# Patient Record
Sex: FEMALE | Race: WHITE | NOT HISPANIC OR LATINO | Employment: OTHER | URBAN - METROPOLITAN AREA
[De-identification: names, ages, dates, MRNs, and addresses within clinical notes are randomized per-mention and may not be internally consistent; named-entity substitution may affect disease eponyms.]

---

## 2017-04-12 LAB — HCV AB SER-ACNC: NORMAL

## 2018-06-13 RX ORDER — ENALAPRIL MALEATE 5 MG/1
7.5 TABLET ORAL 2 TIMES DAILY
COMMUNITY
Start: 2018-02-19 | End: 2018-06-18 | Stop reason: SDUPTHER

## 2018-06-13 RX ORDER — SACCHAROMYCES BOULARDII 250 MG
250 CAPSULE ORAL 2 TIMES DAILY
COMMUNITY
End: 2021-04-12 | Stop reason: SDUPTHER

## 2018-06-13 RX ORDER — B-COMPLEX WITH VITAMIN C
1 TABLET ORAL 2 TIMES DAILY
COMMUNITY

## 2018-06-13 RX ORDER — ESTRADIOL 10 UG/1
10 INSERT VAGINAL 2 TIMES WEEKLY
COMMUNITY
Start: 2018-02-05

## 2018-06-13 RX ORDER — CLOTRIMAZOLE AND BETAMETHASONE DIPROPIONATE 10; .64 MG/G; MG/G
CREAM TOPICAL
COMMUNITY
Start: 2017-10-30

## 2018-06-13 RX ORDER — FELODIPINE 5 MG/1
5 TABLET, EXTENDED RELEASE ORAL
COMMUNITY
Start: 2017-10-19 | End: 2018-10-15 | Stop reason: SDUPTHER

## 2018-06-13 RX ORDER — GARLIC 400 MG
1 TABLET ORAL DAILY
COMMUNITY

## 2018-06-13 RX ORDER — ASCORBIC ACID 500 MG
500 TABLET ORAL 2 TIMES DAILY
COMMUNITY
End: 2020-04-13

## 2018-06-13 RX ORDER — LEVOCETIRIZINE DIHYDROCHLORIDE 5 MG/1
5 TABLET, FILM COATED ORAL EVERY EVENING
COMMUNITY
Start: 2016-08-03 | End: 2020-10-12

## 2018-06-13 RX ORDER — PANTOPRAZOLE SODIUM 20 MG/1
20 TABLET, DELAYED RELEASE ORAL DAILY
COMMUNITY
Start: 2016-10-01 | End: 2019-10-14

## 2018-06-13 RX ORDER — DIAPER,BRIEF,ADULT, DISPOSABLE
1 EACH MISCELLANEOUS 3 TIMES DAILY
COMMUNITY
End: 2021-10-18

## 2018-06-18 ENCOUNTER — OFFICE VISIT (OUTPATIENT)
Dept: FAMILY MEDICINE CLINIC | Facility: CLINIC | Age: 73
End: 2018-06-18
Payer: COMMERCIAL

## 2018-06-18 VITALS
WEIGHT: 107 LBS | SYSTOLIC BLOOD PRESSURE: 184 MMHG | DIASTOLIC BLOOD PRESSURE: 84 MMHG | HEART RATE: 74 BPM | BODY MASS INDEX: 20.2 KG/M2 | HEIGHT: 61 IN | OXYGEN SATURATION: 97 %

## 2018-06-18 DIAGNOSIS — F41.9 ANXIETY: ICD-10-CM

## 2018-06-18 DIAGNOSIS — I10 ESSENTIAL HYPERTENSION: Primary | ICD-10-CM

## 2018-06-18 DIAGNOSIS — N81.11 MIDLINE CYSTOCELE: ICD-10-CM

## 2018-06-18 DIAGNOSIS — E78.00 HYPERCHOLESTEROLEMIA: ICD-10-CM

## 2018-06-18 PROCEDURE — 99203 OFFICE O/P NEW LOW 30 MIN: CPT | Performed by: INTERNAL MEDICINE

## 2018-06-18 RX ORDER — BUSPIRONE HYDROCHLORIDE 5 MG/1
5 TABLET ORAL 2 TIMES DAILY
Qty: 60 TABLET | Refills: 3 | Status: SHIPPED | OUTPATIENT
Start: 2018-06-18 | End: 2019-06-11

## 2018-06-18 RX ORDER — ASCORBIC ACID 125 MG
125 TABLET,CHEWABLE ORAL EVERY EVENING
COMMUNITY

## 2018-06-18 RX ORDER — GUAIFENESIN 600 MG
1200 TABLET, EXTENDED RELEASE 12 HR ORAL DAILY
COMMUNITY
End: 2020-04-13

## 2018-06-18 RX ORDER — ENALAPRIL MALEATE 5 MG/1
7.5 TABLET ORAL 2 TIMES DAILY
Qty: 270 TABLET | Refills: 3 | Status: SHIPPED | OUTPATIENT
Start: 2018-06-18 | End: 2019-04-25

## 2018-06-18 NOTE — ASSESSMENT & PLAN NOTE
She has always had office hypertension but only recently has had more labored bili D at home  After long discussion, we decided to go ahead and  Increase her enalapril to 7 5 mg twice a day  She will continue home blood pressure monitoring and send in a [employer] message in a few weeks with her readings

## 2018-06-18 NOTE — ASSESSMENT & PLAN NOTE
She has been intolerant of multiple statins  Continue current supplements  Check fasting lipid panel before next visit

## 2018-06-18 NOTE — PATIENT INSTRUCTIONS
Blood pressure is not well controlled  We will increase enalapril to 7 5 mg twice a day  Please continue to monitor home blood pressures and send readings in about 4 weeks through [facility]  Continue follow-up with Dr Maria D Benson for the bladder prolapse issue  Start Buspar 5 mg twice a day for anxiety

## 2018-06-18 NOTE — ASSESSMENT & PLAN NOTE
Recommended she follow up with the urogynecologist and perhaps get a 2nd opinion if needed regarding the type of mesh to be used for the repair

## 2018-06-18 NOTE — PROGRESS NOTES
Assessment/Plan:    No problem-specific Assessment & Plan notes found for this encounter  Chief complaint: Blood pressure is not controlled  HPI:    Dylan العلي is a 68 y o  female   Who presents for new patient visit  Her home blood pressures have been labile  She has many readings that are in the 031/73 systolic  She has not had any chest pain, shortness of breath, or palpitations  However, when she is anxious, she gets readings in the 160s  Her current concern is related to her need for a sling procedure to repair her cystocele  She had taken Librium in the past for anxiety but does not have a prescription now  Past Medical History:   Diagnosis Date    Anxiety     Cystocele, midline     GERD (gastroesophageal reflux disease)     Hypercholesterolemia     Hyperplastic colonic polyp 09/30/2005    Hypertension     essential     Infected cat bite of hand Approximately 2008     she was admitted to [facility] for intravenous therapy after a cat bite became infected  No sequelae      Menopause     Osteoporosis     Palpitations     Proximal leg weakness 04/24/2017    Vitamin D deficiency         Past Surgical History:   Procedure Laterality Date    CHOLECYSTECTOMY      COMBINED HYSTEROSCOPY DIAGNOSTIC / D&C  1966, 1990    HYSTERECTOMY  1991    LITHOTRIPSY  1990    SKIN CANCER EXCISION      TEAR DUCT SURGERY  1996    TONSILLECTOMY  1950s    TOTAL ABDOMINAL HYSTERECTOMY W/ BILATERAL SALPINGOOPHORECTOMY  1991    TUBAL LIGATION      1997        Family History   Problem Relation Age of Onset    Heart disease Mother     Hypertension Mother     Stroke Mother     Cancer Mother     Breast cancer Father     Coronary artery disease Father     Hypertension Father     Stroke Maternal Grandmother     Heart disease Paternal Grandfather         Social History     Social History    Marital status: /Civil Union     Spouse name: N/A    Number of children: N/A    Years of education: N/A     Occupational History    Not on file       Social History Main Topics    Smoking status: Former Smoker     Packs/day: 2 50     Years: 3 00    Smokeless tobacco: Never Used    Alcohol use No    Drug use: No    Sexual activity: Not on file     Other Topics Concern    Not on file     Social History Narrative    No narrative on file      Current Outpatient Prescriptions   Medication Sig Dispense Refill    ACIDOPHILUS LACTOBACILLUS PO Take 1 tablet by mouth daily        ascorbic acid (VITAMIN C) 500 mg tablet Take 500 mg by mouth 2 (two) times a day        B Complex Vitamins (VITAMIN B COMPLEX) TABS Take 1 tablet by mouth 2 (two) times a day        Coenzyme Q10 (CO Q 10) 100 MG CAPS Take 1 tablet by mouth 3 (three) times a day        enalapril (VASOTEC) 5 mg tablet  take one tablet by mouth twice a day      estradiol (VAGIFEM, YUVAFEM) 10 MCG TABS vaginal tablet Insert 10 mcg into the vagina every other day        felodipine (PLENDIL) 5 mg 24 hr tablet Take 5 mg by mouth daily at bedtime        Garlic 396 MG TBEC Take 1 tablet by mouth daily        guaiFENesin (MUCINEX) 600 mg 12 hr tablet Take 1,200 mg by mouth daily      Lecithin 1200 MG CAPS Take 1 capsule by mouth 3 (three) times a day        levocetirizine (XYZAL) 5 MG tablet Take 5 mg by mouth every evening        loteprednol (ALREX) 0 2 % SUSP Apply 1 drop to eye daily        Magnesium Citrate 125 MG CAPS Take 125 mg by mouth every evening      Multiple Vitamins-Minerals (MULTIVITAMIN ADULT PO) Take 1 tablet by mouth daily        OMEGA-3 FATTY ACIDS PO Take 1 capsule by mouth 2 (two) times a day        pantoprazole (PROTONIX) 20 mg tablet Take 20 mg by mouth daily        saccharomyces boulardii (FLORASTOR) 250 mg capsule Take 250 mg by mouth 2 (two) times a day        sodium chloride (SHAKA 128) 2 % hypertonic ophthalmic solution Administer 1 drop to both eyes 2 (two) times a day        busPIRone (BUSPAR) 5 mg tablet Take 1 tablet (5 mg total) by mouth 2 (two) times a day 60 tablet 3    clotrimazole-betamethasone (LOTRISONE) 1-0 05 % cream Apply to affected area 2 times daily as needed            Allergies   Allergen Reactions    Clindamycin      Clindamycin vaginal cream    Alendronate Other (See Comments)     Reaction: Lump in throat     Gabapentin     Nitrofurantoin Other (See Comments)     Reaction: Fever, achy  Did okay on 3 day course of medication 2016    Other Other (See Comments)     *Illisone; *Noxema    Penicillins Other (See Comments)     Reaction: [GI]  Has taken ampicillin and did fine recently   2016    Prochlorperazine Other (See Comments)     Reaction: Muscle spasms    Pseudoephedrine Other (See Comments)     Reaction: heart racing    Sertraline Other (See Comments)     Reaction: Nose bleeds, Head tremors    Simvastatin Other (See Comments)     Reaction:[Derm]    Tetracyclines & Related Other (See Comments)     [GI]    Thiazide-Type Diuretics     Vancomycin     Amoxicillin-Pot Clavulanate Rash    Olopatadine Palpitations       Review of Systems   Constitutional: Negative for chills, diaphoresis and unexpected weight change  Respiratory: Negative for chest tightness and shortness of breath  Cardiovascular: Negative for chest pain  She walks a mi or more every day without any exertional chest pain or shortness of breath  Gastrointestinal: Positive for constipation  She uses magnesium citrate capsules to regulate constipation  She has had colonoscopy in the past    Genitourinary:        No dysuria  She does have discomfort related to bladder prolapse  She is able to reposition her bladder manually but this has to be done several times a day         BP (!) 184/84 (BP Location: Right arm, Patient Position: Sitting, Cuff Size: Adult)   Pulse 74   Ht 5' 1" (1 549 m)   Wt 48 5 kg (107 lb)   SpO2 97%   BMI 20 22 kg/m²     General:  Well-developed, well-nourished, in no acute distress  Skin:  Warm, moist, no significant lesions  HENT:  Normocephalic, atraumatic, tympanic membranes clear bilaterally, ear canals unremarkable bilaterally, nasal mucosa without lesions, oropharynx was clear without exudate  Eyes: PERRL, EOMI, conjunctivae normal, fundi revealed sharp discs bilaterally without hemorrhages or exudates  Neck:  No thyromegaly, thyroid nodules or cervical lymphadenopathy  Cardiac:  Regular rate and rhythm, no murmur, gallop, or rub  There is no JVD or HJR  Lungs:  Clear to auscultation and percussion  Breasts:   Deferred to gyn  Abdomen:  Soft, nontender, normoactive bowel sounds, no palpable masses, no hepatosplenomegaly  :  Deferred to gyn  Musculoskeletal:  No clubbing, cyanosis, or edema  Neurologic:  Cranial nerves 2-12 intact, motor was 5/5 in all major groups, DTRs 2+ throughout  Psychiatric:  Mood bright, appropriate affect and insight

## 2018-06-26 NOTE — TELEPHONE ENCOUNTER
Reviewed information about buspirone  It is not metabolized by the enzyme that she has the problem with  This medication should be OK for her to take

## 2018-08-06 ENCOUNTER — TELEPHONE (OUTPATIENT)
Dept: FAMILY MEDICINE CLINIC | Facility: CLINIC | Age: 73
End: 2018-08-06

## 2018-08-06 NOTE — TELEPHONE ENCOUNTER
Please contact Aren Chapman to let her know that I did receive her blood pressures in the mail  The majority of her blood pressures are actually pretty good with the exception of 3 readings:  July 1st 171/85, July 3rd 150/79, and July 24th 174/100 (this last when she was at Dr Shaunna Smith office)  I would continue on her current dose of Vasotec  Please ask her to sign up for [address] so we can communicate via e-mail

## 2018-10-15 ENCOUNTER — TELEPHONE (OUTPATIENT)
Dept: FAMILY MEDICINE CLINIC | Facility: CLINIC | Age: 73
End: 2018-10-15

## 2018-10-15 DIAGNOSIS — I10 ESSENTIAL HYPERTENSION: Primary | ICD-10-CM

## 2018-10-15 RX ORDER — FELODIPINE 5 MG/1
5 TABLET, EXTENDED RELEASE ORAL
Qty: 90 TABLET | Refills: 3 | Status: SHIPPED | OUTPATIENT
Start: 2018-10-15 | End: 2019-04-25

## 2018-10-15 NOTE — TELEPHONE ENCOUNTER
Patient requesting refill of plendil be sent to [facility]   Thanks

## 2018-12-03 ENCOUNTER — TELEPHONE (OUTPATIENT)
Dept: FAMILY MEDICINE CLINIC | Facility: CLINIC | Age: 73
End: 2018-12-03

## 2018-12-03 NOTE — LETTER
December 3, 2018     Antony Humphrey MD  [facility] [address] [facility]    Patient: Marina Serrano   YOB: 1945   Date of Visit: 12/3/2018       Dear Kp Later:    I have been managing Guera's blood pressure for about 20 years  Her office blood pressures are always higher than her home blood pressures which she checks frequently  Please let me know if you have any other concerns  I hope you and family are doing well         Sincerely,                           Chapin Osborn MD

## 2018-12-03 NOTE — TELEPHONE ENCOUNTER
Patient needs letter faxed to Dr Rhonda Sánchez  OB/GYN  That you are taking care of her blood pressure        Fax number is [phone]    Thank you

## 2018-12-05 ENCOUNTER — TELEPHONE (OUTPATIENT)
Dept: FAMILY MEDICINE CLINIC | Facility: CLINIC | Age: 73
End: 2018-12-05

## 2018-12-05 DIAGNOSIS — M25.551 RIGHT HIP PAIN: Primary | ICD-10-CM

## 2018-12-05 NOTE — TELEPHONE ENCOUNTER
Velma called, she's been having right hip discomfort and would like an xray to see if anything shows up as to the cause  If this is okay without her being seen please put in the order for the right xray   Thank you

## 2018-12-12 RX ORDER — ESTRADIOL 0.1 MG/G
2 CREAM VAGINAL EVERY OTHER DAY
COMMUNITY

## 2019-04-25 ENCOUNTER — OFFICE VISIT (OUTPATIENT)
Dept: FAMILY MEDICINE CLINIC | Facility: CLINIC | Age: 74
End: 2019-04-25
Payer: COMMERCIAL

## 2019-04-25 VITALS
WEIGHT: 108.2 LBS | BODY MASS INDEX: 20.43 KG/M2 | OXYGEN SATURATION: 99 % | HEIGHT: 61 IN | HEART RATE: 80 BPM | DIASTOLIC BLOOD PRESSURE: 82 MMHG | SYSTOLIC BLOOD PRESSURE: 160 MMHG

## 2019-04-25 DIAGNOSIS — G89.29 CHRONIC PAIN OF BOTH KNEES: ICD-10-CM

## 2019-04-25 DIAGNOSIS — M25.561 CHRONIC PAIN OF BOTH KNEES: ICD-10-CM

## 2019-04-25 DIAGNOSIS — I10 ESSENTIAL HYPERTENSION: ICD-10-CM

## 2019-04-25 DIAGNOSIS — M25.562 CHRONIC PAIN OF BOTH KNEES: ICD-10-CM

## 2019-04-25 DIAGNOSIS — F41.0 PANIC DISORDER: Primary | ICD-10-CM

## 2019-04-25 PROCEDURE — 99214 OFFICE O/P EST MOD 30 MIN: CPT | Performed by: INTERNAL MEDICINE

## 2019-04-25 PROCEDURE — 3725F SCREEN DEPRESSION PERFORMED: CPT | Performed by: INTERNAL MEDICINE

## 2019-04-25 RX ORDER — FELODIPINE 10 MG/1
10 TABLET, EXTENDED RELEASE ORAL
COMMUNITY
Start: 2019-04-25 | End: 2021-02-08 | Stop reason: SDUPTHER

## 2019-04-25 RX ORDER — ENALAPRIL MALEATE 10 MG/1
10 TABLET ORAL 2 TIMES DAILY
COMMUNITY
Start: 2019-04-25 | End: 2020-02-24 | Stop reason: SDUPTHER

## 2019-04-25 RX ORDER — CHLORDIAZEPOXIDE HYDROCHLORIDE 5 MG/1
5 CAPSULE, GELATIN COATED ORAL DAILY PRN
Qty: 30 CAPSULE | Refills: 0 | Status: SHIPPED | OUTPATIENT
Start: 2019-04-25 | End: 2019-10-14

## 2019-04-29 ENCOUNTER — TELEPHONE (OUTPATIENT)
Dept: FAMILY MEDICINE CLINIC | Facility: CLINIC | Age: 74
End: 2019-04-29

## 2019-05-06 ENCOUNTER — TELEPHONE (OUTPATIENT)
Dept: FAMILY MEDICINE CLINIC | Facility: CLINIC | Age: 74
End: 2019-05-06

## 2019-05-06 DIAGNOSIS — Z12.31 ENCOUNTER FOR SCREENING MAMMOGRAM FOR BREAST CANCER: Primary | ICD-10-CM

## 2019-05-28 ENCOUNTER — TELEPHONE (OUTPATIENT)
Dept: FAMILY MEDICINE CLINIC | Facility: CLINIC | Age: 74
End: 2019-05-28

## 2019-05-28 DIAGNOSIS — B37.3 VAGINAL CANDIDIASIS: Primary | ICD-10-CM

## 2019-05-28 RX ORDER — FLUCONAZOLE 150 MG/1
150 TABLET ORAL ONCE
Qty: 1 TABLET | Refills: 0 | Status: SHIPPED | OUTPATIENT
Start: 2019-05-28 | End: 2019-05-28

## 2019-06-01 DIAGNOSIS — Z12.31 ENCOUNTER FOR SCREENING MAMMOGRAM FOR BREAST CANCER: ICD-10-CM

## 2019-06-11 ENCOUNTER — TRANSCRIBE ORDERS (OUTPATIENT)
Dept: ADMINISTRATIVE | Facility: HOSPITAL | Age: 74
End: 2019-06-11

## 2019-06-11 ENCOUNTER — OFFICE VISIT (OUTPATIENT)
Dept: FAMILY MEDICINE CLINIC | Facility: CLINIC | Age: 74
End: 2019-06-11
Payer: COMMERCIAL

## 2019-06-11 ENCOUNTER — APPOINTMENT (OUTPATIENT)
Dept: LAB | Facility: CLINIC | Age: 74
End: 2019-06-11
Payer: COMMERCIAL

## 2019-06-11 VITALS
HEART RATE: 81 BPM | DIASTOLIC BLOOD PRESSURE: 82 MMHG | OXYGEN SATURATION: 98 % | WEIGHT: 103.8 LBS | SYSTOLIC BLOOD PRESSURE: 150 MMHG | HEIGHT: 61 IN | BODY MASS INDEX: 19.6 KG/M2

## 2019-06-11 DIAGNOSIS — M62.81 MUSCLE WEAKNESS OF LOWER EXTREMITY: ICD-10-CM

## 2019-06-11 DIAGNOSIS — M62.81 MUSCLE WEAKNESS OF LOWER EXTREMITY: Primary | ICD-10-CM

## 2019-06-11 LAB
ALT SERPL W P-5'-P-CCNC: 28 U/L (ref 12–78)
AST SERPL W P-5'-P-CCNC: 20 U/L (ref 5–45)
CK SERPL-CCNC: 38 U/L (ref 26–192)
LDH SERPL-CCNC: 229 U/L (ref 81–234)

## 2019-06-11 PROCEDURE — 99214 OFFICE O/P EST MOD 30 MIN: CPT | Performed by: INTERNAL MEDICINE

## 2019-06-11 PROCEDURE — 86038 ANTINUCLEAR ANTIBODIES: CPT

## 2019-06-11 PROCEDURE — 83519 RIA NONANTIBODY: CPT

## 2019-06-11 PROCEDURE — 36415 COLL VENOUS BLD VENIPUNCTURE: CPT

## 2019-06-11 PROCEDURE — 84238 ASSAY NONENDOCRINE RECEPTOR: CPT

## 2019-06-11 PROCEDURE — 84460 ALANINE AMINO (ALT) (SGPT): CPT

## 2019-06-11 PROCEDURE — 82085 ASSAY OF ALDOLASE: CPT

## 2019-06-11 PROCEDURE — 84450 TRANSFERASE (AST) (SGOT): CPT

## 2019-06-11 PROCEDURE — 82550 ASSAY OF CK (CPK): CPT

## 2019-06-11 PROCEDURE — 83615 LACTATE (LD) (LDH) ENZYME: CPT

## 2019-06-12 LAB
ALDOLASE SERPL-CCNC: 5.6 U/L (ref 3.3–10.3)
RYE IGE QN: NEGATIVE

## 2019-06-13 LAB — ACHR BIND AB SER-SCNC: 0.07 NMOL/L (ref 0–0.24)

## 2019-06-14 LAB — ACHR BLOCK AB/ACHR TOTAL SFR SER: 18 % (ref 0–25)

## 2019-06-17 ENCOUNTER — TELEPHONE (OUTPATIENT)
Dept: FAMILY MEDICINE CLINIC | Facility: CLINIC | Age: 74
End: 2019-06-17

## 2019-06-17 LAB — ACHR MOD AB/ACHR TOTAL SFR SER: <12 % (ref 0–20)

## 2019-07-01 ENCOUNTER — OFFICE VISIT (OUTPATIENT)
Dept: FAMILY MEDICINE CLINIC | Facility: CLINIC | Age: 74
End: 2019-07-01
Payer: COMMERCIAL

## 2019-07-01 VITALS
OXYGEN SATURATION: 98 % | DIASTOLIC BLOOD PRESSURE: 84 MMHG | HEIGHT: 61 IN | WEIGHT: 102.8 LBS | SYSTOLIC BLOOD PRESSURE: 150 MMHG | BODY MASS INDEX: 19.41 KG/M2 | HEART RATE: 79 BPM

## 2019-07-01 DIAGNOSIS — M81.8 OTHER OSTEOPOROSIS WITHOUT CURRENT PATHOLOGICAL FRACTURE: ICD-10-CM

## 2019-07-01 DIAGNOSIS — I10 ESSENTIAL HYPERTENSION: ICD-10-CM

## 2019-07-01 DIAGNOSIS — Z79.899 ENCOUNTER FOR MONITORING DIURETIC THERAPY: Primary | ICD-10-CM

## 2019-07-01 DIAGNOSIS — Z51.81 ENCOUNTER FOR MONITORING DIURETIC THERAPY: Primary | ICD-10-CM

## 2019-07-01 PROCEDURE — 99213 OFFICE O/P EST LOW 20 MIN: CPT | Performed by: INTERNAL MEDICINE

## 2019-07-01 PROCEDURE — 1036F TOBACCO NON-USER: CPT | Performed by: INTERNAL MEDICINE

## 2019-07-01 PROCEDURE — 3008F BODY MASS INDEX DOCD: CPT | Performed by: INTERNAL MEDICINE

## 2019-07-01 PROCEDURE — 1125F AMNT PAIN NOTED PAIN PRSNT: CPT | Performed by: INTERNAL MEDICINE

## 2019-07-01 PROCEDURE — 1101F PT FALLS ASSESS-DOCD LE1/YR: CPT | Performed by: INTERNAL MEDICINE

## 2019-07-01 PROCEDURE — 1160F RVW MEDS BY RX/DR IN RCRD: CPT | Performed by: INTERNAL MEDICINE

## 2019-07-01 PROCEDURE — 1170F FXNL STATUS ASSESSED: CPT | Performed by: INTERNAL MEDICINE

## 2019-07-01 PROCEDURE — G0438 PPPS, INITIAL VISIT: HCPCS | Performed by: INTERNAL MEDICINE

## 2019-07-01 RX ORDER — SPIRONOLACTONE 25 MG/1
12.5 TABLET ORAL DAILY
Qty: 15 TABLET | Refills: 5 | Status: SHIPPED | OUTPATIENT
Start: 2019-07-01 | End: 2019-10-14

## 2019-07-01 NOTE — ASSESSMENT & PLAN NOTE
Both office and home blood pressures are elevated  We are going to add spironolactone 25 mg 1/2 tablet daily as tolerated  We will check a basic metabolic panel in 2 weeks  Call in blood pressures in 2 weeks

## 2019-07-01 NOTE — ASSESSMENT & PLAN NOTE
We discussed several options for treatment but she is particularly concerned about taking the drugs that are only given twice a year because once that drugs in her system, she will be stuck with the side effects that it might cause  I suggest that she actually go to see Dr Anila Mercado to get his expert opinion about what the best options would be for her

## 2019-07-01 NOTE — PATIENT INSTRUCTIONS
Essential hypertension  Both office sent home blood pressures are elevated  We are going to add spironolactone 25 mg 1/2 tablet daily as tolerated  We will check a basic metabolic panel in 2 weeks  Call in blood pressures in 2 weeks

## 2019-07-01 NOTE — PROGRESS NOTES
Han Flores is here for her Subsequent Wellness visit  Last Medicare Wellness visit information reviewed, patient interviewed and updates made to the record today  Health Risk Assessment:  Patient rates overall health as good  Patient feels that their physical health rating is Same  Eyesight was rated as Same  Hearing was rated as Same  Patient feels that their emotional and mental health rating is Same  Pain experienced by patient in the last 7 days has been None  Patient states that she has experienced no weight loss or gain in last 6 months  Emotional/Mental Health:  Patient has not been feeling nervous/anxious  PHQ-9 Depression Screening:    Frequency of the following problems over the past two weeks:      1  Little interest or pleasure in doing things: 0 - not at all      2  Feeling down, depressed, or hopeless: 0 - not at all  PHQ-2 Score: 0          Broken Bones/Falls: Fall Risk Assessment:    In the past year, patient has experienced: No history of falling in past year          Bladder/Bowel:  Patient has not leaked urine accidently in the last six months  Patient reports no loss of bowel control  Immunizations:  Patient has had a flu vaccination within the last year  Patient has received a pneumonia shot  Patient has not received a shingles shot  Patient has not received tetanus/diphtheria shot  Home Safety:  Patient does not have trouble with stairs inside or outside of their home  Patient currently reports that there are no safety hazards present in home, working smoke alarms, working carbon monoxide detectors  Preventative Screenings:   Breast cancer screening performed, colon cancer screen completed, cholesterol screen completed, glaucoma eye exam completed,     Nutrition:  Current diet: Low Saturated Fat, Limited junk food and No Added Salt with servings of the following:    Medications:  Patient is currently taking over-the-counter supplements       List of OTC medications includes: See med list  Patient is able to manage medications  Lifestyle Choices:  Patient reports no tobacco use  Patient has smoked or used tobacco in the past   Patient has stopped her tobacco use  Tobacco use quit date: 1960s  Patient reports no alcohol use  Patient drives a vehicle  Patient wears seat belt  Current level of exercise of physical activity described by patient as: Walks 30 minutes most day           Activities of Daily Living:  Can get out of bed by his or her self, able to dress self, able to make own meals, able to do own shopping, able to bathe self, can do own laundry/housekeeping, can manage own money, pay bills and track expenses    Previous Hospitalizations:  Hospitalization or ED visit in past 12 months  Number of hospitalizations within the last year: 1-2  Additional Comments: Had an ED visit for urinary retention after bladder surgery  Advanced Directives:  Patient has decided on a power of   Patient has spoken to designated power of   Patient has not completed advanced directive  Additional Comments: Discussed importance of completing living will  Social Support: Lives with spouse

## 2019-07-01 NOTE — PROGRESS NOTES
Assessment/Plan:    Problem List Items Addressed This Visit        Cardiovascular and Mediastinum    Essential hypertension     Both office and home blood pressures are elevated  We are going to add spironolactone 25 mg 1/2 tablet daily as tolerated  We will check a basic metabolic panel in 2 weeks  Call in blood pressures in 2 weeks  Relevant Medications    spironolactone (ALDACTONE) 25 mg tablet       Musculoskeletal and Integument    Osteoporosis     We discussed several options for treatment but she is particularly concerned about taking the drugs that are only given twice a year because once that drugs in her system, she will be stuck with the side effects that it might cause  I suggest that she actually go to see Dr Nelly Santiago to get his expert opinion about what the best options would be for her  Other Visit Diagnoses     Encounter for monitoring diuretic therapy    -  Primary    Relevant Orders    Basic metabolic panel          Subjective:     Patient ID: Coni Mae is a 69 y o  female was here today for annual wellness visit but brought in her home blood pressures which have been elevated  She usually has home blood pressures which are less than 143 systolic  However, her last several readings are 191-213 systolic  She is still taking enalapril 10 mg twice a day and felodipine 10 mg daily  She had taken will was probably hydrochlorothiazide in the past (given by another primary care physician) which caused her blood pressure to bottom out    In addition, the PA at the rheumatologist's office ordered a DEXA which does show that she has osteoporosis in her lumbar spine and in the femoral neck  She was advised to go on treatment for osteoporosis but she is wary of taking any medications because of her sensitivity to medications (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

## 2019-07-08 ENCOUNTER — TELEPHONE (OUTPATIENT)
Dept: FAMILY MEDICINE CLINIC | Facility: CLINIC | Age: 74
End: 2019-07-08

## 2019-07-08 NOTE — TELEPHONE ENCOUNTER
Patient was started on Spirnolactone last Thursday with Dr Oliver Arely  She is not complaining of diarrhea since Friday  She is wondering if it is from the medication and what she should do  She is having some stomach cramping also       Thank you

## 2019-07-08 NOTE — TELEPHONE ENCOUNTER
It is a common side effect of the drug and is stomach upset and abdominal cramping  She should try to continue with the medicine  Can take imodium to stop the diarrhea and Pepcid OTC to help with the stomach upset

## 2019-07-09 NOTE — TELEPHONE ENCOUNTER
Spironolactone can have side effects related to potassium  I know Dr Benito Mcgee wanted her to have a metabolic panel drawn in two weeks from starting the medication to check her potassium etc but she should have it drawn now  If she is experiencing this much discomfort from the medication, the other option is to stop it at the present time (since she just began the therapy) and he can address it on Monday when he returns

## 2019-07-15 NOTE — TELEPHONE ENCOUNTER
If she stopped the meds, there's no need to have labs done  I was doing the test to monitor potassium

## 2019-07-15 NOTE — TELEPHONE ENCOUNTER
Patient and  aware  They will continue to monitor her blood pressure reading at home   Will call with any concerns

## 2019-07-15 NOTE — TELEPHONE ENCOUNTER
Patient said she stopped meds 2 days ago  Patient is wondering when you would like her to have labs done   Patient's cell phone number is [phone]

## 2019-07-24 ENCOUNTER — TELEPHONE (OUTPATIENT)
Dept: FAMILY MEDICINE CLINIC | Facility: CLINIC | Age: 74
End: 2019-07-24

## 2019-07-24 NOTE — TELEPHONE ENCOUNTER
Patient called and stated that you have asked her to monitor her blood pressure and she stated that the Systolic has been between 542-014 and the Diastolic has been from 50-93

## 2019-09-18 ENCOUNTER — TELEPHONE (OUTPATIENT)
Dept: FAMILY MEDICINE CLINIC | Facility: CLINIC | Age: 74
End: 2019-09-18

## 2019-09-18 DIAGNOSIS — R25.2 LEG CRAMPS: Primary | ICD-10-CM

## 2019-09-18 NOTE — TELEPHONE ENCOUNTER
Patient is having leg cramps only at night  She is wondering what you would recommend she do? It has been getting worse she said

## 2019-09-18 NOTE — TELEPHONE ENCOUNTER
We should check electrolytes and magnesium level  Treatment of leg cramps is somewhat challenging as there is no one medication that has been shown to really be effective  Furthermore, she is very sensitive to many medications  I would recommend that she try doing calf stretches against a wall with her arms leaning on the wall while keeping her legs back every evening before bedtime  She should also loosen the sheets at the foot of the bed  If this approach is ineffective at decreasing the leg cramps, we can discuss other treatments

## 2019-10-10 LAB
CHOLEST SERPL-MCNC: 188 MG/DL (ref ?–200)
EXT GLUCOSE BLD: 100 (ref 70–99)
EXTERNAL ANION GAP: 7
EXTERNAL BUN: 13
EXTERNAL CALCIUM: 10.1
EXTERNAL CHLORIDE: 105 (ref ?–107)
EXTERNAL CO2: 30.4
EXTERNAL CREATININE: 0.7
EXTERNAL EGFR: 129.69
EXTERNAL POTASSIUM: 3.9 (ref ?–5.1)
EXTERNAL SODIUM: 142 (ref ?–145)
HDLC SERPL-MCNC: 70 MG/DL (ref 40–60)
LDLC SERPL CALC-MCNC: 106 MG/DL (ref 0–100)
TRIGL SERPL-MCNC: 58 MG/DL (ref ?–150)

## 2019-10-14 ENCOUNTER — OFFICE VISIT (OUTPATIENT)
Dept: FAMILY MEDICINE CLINIC | Facility: CLINIC | Age: 74
End: 2019-10-14
Payer: COMMERCIAL

## 2019-10-14 VITALS
BODY MASS INDEX: 19.85 KG/M2 | HEIGHT: 61 IN | WEIGHT: 105.16 LBS | HEART RATE: 68 BPM | SYSTOLIC BLOOD PRESSURE: 150 MMHG | DIASTOLIC BLOOD PRESSURE: 92 MMHG | OXYGEN SATURATION: 98 %

## 2019-10-14 DIAGNOSIS — E88.89 POOR DRUG METABOLIZER DUE TO CYTOCHROME P450 CYP2D6 VARIANT (HCC): ICD-10-CM

## 2019-10-14 DIAGNOSIS — K21.9 GASTROESOPHAGEAL REFLUX DISEASE, ESOPHAGITIS PRESENCE NOT SPECIFIED: ICD-10-CM

## 2019-10-14 DIAGNOSIS — G47.62 NOCTURNAL LEG CRAMPS: ICD-10-CM

## 2019-10-14 DIAGNOSIS — E78.00 HYPERCHOLESTEROLEMIA: ICD-10-CM

## 2019-10-14 DIAGNOSIS — Z23 NEEDS FLU SHOT: Primary | ICD-10-CM

## 2019-10-14 DIAGNOSIS — I10 ESSENTIAL HYPERTENSION: ICD-10-CM

## 2019-10-14 DIAGNOSIS — M81.8 OTHER OSTEOPOROSIS WITHOUT CURRENT PATHOLOGICAL FRACTURE: ICD-10-CM

## 2019-10-14 PROCEDURE — 99214 OFFICE O/P EST MOD 30 MIN: CPT | Performed by: INTERNAL MEDICINE

## 2019-10-14 PROCEDURE — 90662 IIV NO PRSV INCREASED AG IM: CPT | Performed by: INTERNAL MEDICINE

## 2019-10-14 PROCEDURE — 3008F BODY MASS INDEX DOCD: CPT | Performed by: INTERNAL MEDICINE

## 2019-10-14 PROCEDURE — G0008 ADMIN INFLUENZA VIRUS VAC: HCPCS | Performed by: INTERNAL MEDICINE

## 2019-10-14 NOTE — PATIENT INSTRUCTIONS
Continue current medications  Continue to track home blood pressure  Get blood work a few days before next visit

## 2019-10-14 NOTE — ASSESSMENT & PLAN NOTE
She has office hypertension  Her home blood pressures are actually much better controlled even though she did not tolerate taking spironolactone  Continue enalapril and felodipine

## 2019-10-14 NOTE — ASSESSMENT & PLAN NOTE
We again discussed the long-term risk of fractures without treatment for the osteoporosis  She is wary of taking any medications for osteoporosis and does not want to go back to see the rheumatologist   Expectant management at this point  Continue exercise

## 2019-10-14 NOTE — ASSESSMENT & PLAN NOTE
Leg stretches before bedtime have help prevent the cramps    Advised her to set her reminder on her iPhone so she remembers to do her leg exercises before bedtime

## 2019-10-14 NOTE — ASSESSMENT & PLAN NOTE
No reflux symptoms despite being off of Protonix  Continue Perfusia SR since it seems to have improved symptoms    We did discuss there is no good research supporting the efficacy or safety of most supplements or herbal

## 2019-10-14 NOTE — PROGRESS NOTES
Assessment/Plan:    Problem List Items Addressed This Visit        Digestive    Gastroesophageal reflux disease     No reflux symptoms despite being off of Protonix  Continue Perfusia SR since it seems to have improved symptoms  We did discuss there is no good research supporting the efficacy or safety of most supplements or herbal             Cardiovascular and Mediastinum    Essential hypertension     She has office hypertension  Her home blood pressures are actually much better controlled even though she did not tolerate taking spironolactone  Continue enalapril and felodipine  Relevant Orders    Basic metabolic panel       Musculoskeletal and Integument    Osteoporosis     We again discussed the long-term risk of fractures without treatment for the osteoporosis  She is wary of taking any medications for osteoporosis and does not want to go back to see the rheumatologist   Expectant management at this point  Continue exercise  Other    Hypercholesterolemia     Lipid panel has improved  She has a high HDL  She was severely intolerant of statins in the past   Continue diet  Relevant Orders    Basic metabolic panel    Lipid panel    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 ([address] )     Will continue to avoid medications metabolized by CYP2D6  Nocturnal leg cramps     Leg stretches before bedtime have help prevent the cramps  Advised her to set her reminder on her iPhone so she remembers to do her leg exercises before bedtime           Other Visit Diagnoses     Needs flu shot    -  Primary    Relevant Orders    influenza vaccine, [id number], high-dose, PF 0 5 mL (FLUZONE HIGH-DOSE)          Subjective:     Patient ID: Deonna Díaz is a 69 y o  female here for follow-up  Since the last visit, most of the home blood pressures have been running between 108 and 135 with a couple of elevations to 145-159  Diastolics have all been between 64-80   Developed diarrhea on Inspra so she discontinued it  Her leg cramps have improved with doing leg stretching before bedtime (when she remembers to do them)  She didn't go back to the rheumatologist to discuss osteoporosis treatment because she doesn't want to pursue injectable treatment  Her heartburn is doing well even off of the Protonix  She is taking a supplement called Perfusia SR which she feels is helping  Objective:    /92 (BP Location: Left arm, Patient Position: Sitting, Cuff Size: Standard)   Pulse 68   Ht 5' 1" (1 549 m)   Wt 47 7 kg (105 lb 2 6 oz)   SpO2 98%   BMI 19 87 kg/m²     Wt Readings from Last 3 Encounters:   10/14/19 47 7 kg (105 lb 2 6 oz)   07/01/19 46 6 kg (102 lb 12 8 oz)   06/11/19 47 1 kg (103 lb 12 8 oz)         Physical Exam    General:  Well-developed, well-nourished, in no acute distress  Cardiac:  Regular rate and rhythm, no murmur, gallop, or rub  There is no JVD or HJR  Lungs:  Clear to auscultation and percussion  Abdomen:  Soft, nontender, normoactive bowel sounds, no palpable masses, no hepatosplenomegaly  Extremities:  No clubbing, cyanosis, or edema

## 2019-10-14 NOTE — ASSESSMENT & PLAN NOTE
Lipid panel has improved  She has a high HDL  She was severely intolerant of statins in the past   Continue diet

## 2019-11-20 ENCOUNTER — TELEPHONE (OUTPATIENT)
Dept: FAMILY MEDICINE CLINIC | Facility: CLINIC | Age: 74
End: 2019-11-20

## 2019-11-20 DIAGNOSIS — M25.561 CHRONIC PAIN OF BOTH KNEES: Primary | ICD-10-CM

## 2019-11-20 DIAGNOSIS — G89.29 CHRONIC PAIN OF BOTH KNEES: Primary | ICD-10-CM

## 2019-11-20 DIAGNOSIS — M25.562 CHRONIC PAIN OF BOTH KNEES: Primary | ICD-10-CM

## 2019-11-20 NOTE — TELEPHONE ENCOUNTER
Patient called and is concerned about the increasing knee pain  She is wondering if she should have xrays done  The pain is in both knees

## 2019-11-20 NOTE — TELEPHONE ENCOUNTER
Kirill Mack,     Can you please her to tell her Dr Titus Correia would like to have the xrays? The slip will probably need faxed to [facility] for testing

## 2019-11-25 ENCOUNTER — TELEPHONE (OUTPATIENT)
Dept: FAMILY MEDICINE CLINIC | Facility: CLINIC | Age: 74
End: 2019-11-25

## 2019-11-25 DIAGNOSIS — G89.29 CHRONIC PAIN OF BOTH KNEES: Primary | ICD-10-CM

## 2019-11-25 DIAGNOSIS — M25.561 CHRONIC PAIN OF BOTH KNEES: Primary | ICD-10-CM

## 2019-11-25 DIAGNOSIS — M25.562 CHRONIC PAIN OF BOTH KNEES: Primary | ICD-10-CM

## 2019-11-25 NOTE — TELEPHONE ENCOUNTER
I let her know that her knee x-rays both show mild degenerative arthritis  If she wants to try something we can use topical Voltaren gel  This works locally and is minimally absorbed into the body  There is minimal CYP2D6 metabolism  She does not want any treatment at the present time

## 2019-11-25 NOTE — TELEPHONE ENCOUNTER
Patient called back with questions relating to the ointment you suggested  What is the name? How do you spell it? Is it an RX, if so, she would like it called into [facility]

## 2019-12-27 ENCOUNTER — TELEPHONE (OUTPATIENT)
Dept: FAMILY MEDICINE CLINIC | Facility: CLINIC | Age: 74
End: 2019-12-27

## 2019-12-27 NOTE — TELEPHONE ENCOUNTER
Patients  called and stated that Gentry Casillas has a really bad chest cold denies a fever or any other symptoms, they would like an antibiotic  I advised patient's  that you would want to see her as Dr Seth Snowden is out of the office  Patients  stated that it is out of the question due to them living so far away and with her being sick he is not letting her leave the house

## 2019-12-27 NOTE — TELEPHONE ENCOUNTER
Since she is not my patient, and I do not know her history, I would need to evaluate her before I prescribe an antibiotic, if needed, as there are different ones prescribed for different symptoms  If they do not want to travel far due to her being sick, my best recommendation would be for them to go to an urgent care locally so she can be examined and the correct medication prescribed to treat her illness

## 2020-02-24 DIAGNOSIS — I10 ESSENTIAL HYPERTENSION: ICD-10-CM

## 2020-02-24 RX ORDER — ENALAPRIL MALEATE 10 MG/1
10 TABLET ORAL 2 TIMES DAILY
Qty: 180 TABLET | Refills: 1 | Status: SHIPPED | OUTPATIENT
Start: 2020-02-24 | End: 2021-03-01 | Stop reason: SDUPTHER

## 2020-04-13 ENCOUNTER — TELEMEDICINE (OUTPATIENT)
Dept: FAMILY MEDICINE CLINIC | Facility: CLINIC | Age: 75
End: 2020-04-13
Payer: COMMERCIAL

## 2020-04-13 VITALS
HEART RATE: 62 BPM | SYSTOLIC BLOOD PRESSURE: 121 MMHG | HEIGHT: 61 IN | WEIGHT: 104 LBS | DIASTOLIC BLOOD PRESSURE: 62 MMHG | BODY MASS INDEX: 19.63 KG/M2

## 2020-04-13 DIAGNOSIS — Z12.31 ENCOUNTER FOR SCREENING MAMMOGRAM FOR BREAST CANCER: Primary | ICD-10-CM

## 2020-04-13 DIAGNOSIS — E78.00 HYPERCHOLESTEROLEMIA: ICD-10-CM

## 2020-04-13 DIAGNOSIS — K21.9 GASTROESOPHAGEAL REFLUX DISEASE, ESOPHAGITIS PRESENCE NOT SPECIFIED: ICD-10-CM

## 2020-04-13 DIAGNOSIS — I10 ESSENTIAL HYPERTENSION: ICD-10-CM

## 2020-04-13 PROCEDURE — 1160F RVW MEDS BY RX/DR IN RCRD: CPT | Performed by: INTERNAL MEDICINE

## 2020-04-13 PROCEDURE — 3008F BODY MASS INDEX DOCD: CPT | Performed by: INTERNAL MEDICINE

## 2020-04-13 PROCEDURE — 3074F SYST BP LT 130 MM HG: CPT | Performed by: INTERNAL MEDICINE

## 2020-04-13 PROCEDURE — 3078F DIAST BP <80 MM HG: CPT | Performed by: INTERNAL MEDICINE

## 2020-04-13 PROCEDURE — 99213 OFFICE O/P EST LOW 20 MIN: CPT | Performed by: INTERNAL MEDICINE

## 2020-04-13 PROCEDURE — 1036F TOBACCO NON-USER: CPT | Performed by: INTERNAL MEDICINE

## 2020-04-13 PROCEDURE — 4040F PNEUMOC VAC/ADMIN/RCVD: CPT | Performed by: INTERNAL MEDICINE

## 2020-04-13 RX ORDER — OLOPATADINE HYDROCHLORIDE 1 MG/ML
1 SOLUTION/ DROPS OPHTHALMIC 2 TIMES DAILY
COMMUNITY

## 2020-04-13 RX ORDER — METHYLDOPA 250 MG
1 TABLET ORAL 2 TIMES DAILY
COMMUNITY

## 2020-10-12 ENCOUNTER — OFFICE VISIT (OUTPATIENT)
Dept: FAMILY MEDICINE CLINIC | Facility: CLINIC | Age: 75
End: 2020-10-12
Payer: COMMERCIAL

## 2020-10-12 VITALS
WEIGHT: 114.4 LBS | HEART RATE: 65 BPM | OXYGEN SATURATION: 98 % | SYSTOLIC BLOOD PRESSURE: 120 MMHG | DIASTOLIC BLOOD PRESSURE: 60 MMHG | BODY MASS INDEX: 21.6 KG/M2 | HEIGHT: 61 IN

## 2020-10-12 DIAGNOSIS — K21.9 GASTROESOPHAGEAL REFLUX DISEASE, UNSPECIFIED WHETHER ESOPHAGITIS PRESENT: ICD-10-CM

## 2020-10-12 DIAGNOSIS — E88.89 POOR DRUG METABOLIZER DUE TO CYTOCHROME P450 CYP2D6 VARIANT (HCC): ICD-10-CM

## 2020-10-12 DIAGNOSIS — I10 ESSENTIAL HYPERTENSION: ICD-10-CM

## 2020-10-12 DIAGNOSIS — E78.49 OTHER HYPERLIPIDEMIA: ICD-10-CM

## 2020-10-12 DIAGNOSIS — Z23 NEEDS FLU SHOT: Primary | ICD-10-CM

## 2020-10-12 DIAGNOSIS — M79.645 PAIN OF LEFT THUMB: ICD-10-CM

## 2020-10-12 PROCEDURE — 1036F TOBACCO NON-USER: CPT | Performed by: INTERNAL MEDICINE

## 2020-10-12 PROCEDURE — 3725F SCREEN DEPRESSION PERFORMED: CPT | Performed by: INTERNAL MEDICINE

## 2020-10-12 PROCEDURE — G0008 ADMIN INFLUENZA VIRUS VAC: HCPCS | Performed by: INTERNAL MEDICINE

## 2020-10-12 PROCEDURE — 99214 OFFICE O/P EST MOD 30 MIN: CPT | Performed by: INTERNAL MEDICINE

## 2020-10-12 PROCEDURE — 3078F DIAST BP <80 MM HG: CPT | Performed by: INTERNAL MEDICINE

## 2020-10-12 PROCEDURE — 90662 IIV NO PRSV INCREASED AG IM: CPT | Performed by: INTERNAL MEDICINE

## 2020-10-12 PROCEDURE — G0439 PPPS, SUBSEQ VISIT: HCPCS | Performed by: INTERNAL MEDICINE

## 2020-10-12 PROCEDURE — 1125F AMNT PAIN NOTED PAIN PRSNT: CPT | Performed by: INTERNAL MEDICINE

## 2020-10-12 PROCEDURE — 1170F FXNL STATUS ASSESSED: CPT | Performed by: INTERNAL MEDICINE

## 2021-01-28 ENCOUNTER — VBI (OUTPATIENT)
Dept: ADMINISTRATIVE | Facility: OTHER | Age: 76
End: 2021-01-28

## 2021-02-08 DIAGNOSIS — I10 ESSENTIAL HYPERTENSION: ICD-10-CM

## 2021-02-08 RX ORDER — FELODIPINE 10 MG/1
10 TABLET, EXTENDED RELEASE ORAL
Qty: 90 TABLET | Refills: 1 | Status: SHIPPED | OUTPATIENT
Start: 2021-02-08 | End: 2021-05-21

## 2021-03-01 DIAGNOSIS — I10 ESSENTIAL HYPERTENSION: ICD-10-CM

## 2021-03-01 RX ORDER — ENALAPRIL MALEATE 10 MG/1
10 TABLET ORAL 2 TIMES DAILY
Qty: 180 TABLET | Refills: 1 | Status: SHIPPED | OUTPATIENT
Start: 2021-03-01 | End: 2021-04-12 | Stop reason: SDUPTHER

## 2021-03-01 NOTE — TELEPHONE ENCOUNTER
Pt called for refill on    Enalapril 10mg    90 day supply       To be sent to [facility]

## 2021-04-12 ENCOUNTER — OFFICE VISIT (OUTPATIENT)
Dept: FAMILY MEDICINE CLINIC | Facility: CLINIC | Age: 76
End: 2021-04-12
Payer: COMMERCIAL

## 2021-04-12 ENCOUNTER — TELEPHONE (OUTPATIENT)
Dept: FAMILY MEDICINE CLINIC | Facility: CLINIC | Age: 76
End: 2021-04-12

## 2021-04-12 VITALS
DIASTOLIC BLOOD PRESSURE: 64 MMHG | OXYGEN SATURATION: 98 % | SYSTOLIC BLOOD PRESSURE: 128 MMHG | TEMPERATURE: 96.9 F | WEIGHT: 120 LBS | HEIGHT: 61 IN | BODY MASS INDEX: 22.66 KG/M2 | HEART RATE: 74 BPM

## 2021-04-12 DIAGNOSIS — M25.562 CHRONIC PAIN OF BOTH KNEES: ICD-10-CM

## 2021-04-12 DIAGNOSIS — M25.561 CHRONIC PAIN OF BOTH KNEES: ICD-10-CM

## 2021-04-12 DIAGNOSIS — E83.52 HYPERCALCEMIA: ICD-10-CM

## 2021-04-12 DIAGNOSIS — Z23 ENCOUNTER FOR IMMUNIZATION: Primary | ICD-10-CM

## 2021-04-12 DIAGNOSIS — G89.29 CHRONIC PAIN OF BOTH KNEES: ICD-10-CM

## 2021-04-12 DIAGNOSIS — I10 ESSENTIAL HYPERTENSION: ICD-10-CM

## 2021-04-12 DIAGNOSIS — E88.89 POOR DRUG METABOLIZER DUE TO CYTOCHROME P450 CYP2D6 VARIANT (HCC): ICD-10-CM

## 2021-04-12 PROCEDURE — 3078F DIAST BP <80 MM HG: CPT | Performed by: INTERNAL MEDICINE

## 2021-04-12 PROCEDURE — 3008F BODY MASS INDEX DOCD: CPT | Performed by: INTERNAL MEDICINE

## 2021-04-12 PROCEDURE — 3074F SYST BP LT 130 MM HG: CPT | Performed by: INTERNAL MEDICINE

## 2021-04-12 PROCEDURE — 99214 OFFICE O/P EST MOD 30 MIN: CPT | Performed by: INTERNAL MEDICINE

## 2021-04-12 PROCEDURE — 1160F RVW MEDS BY RX/DR IN RCRD: CPT | Performed by: INTERNAL MEDICINE

## 2021-04-12 PROCEDURE — 1036F TOBACCO NON-USER: CPT | Performed by: INTERNAL MEDICINE

## 2021-04-12 PROCEDURE — 3725F SCREEN DEPRESSION PERFORMED: CPT | Performed by: INTERNAL MEDICINE

## 2021-04-12 RX ORDER — ENALAPRIL MALEATE 10 MG/1
TABLET ORAL
Qty: 180 TABLET | Refills: 1 | COMMUNITY
Start: 2021-04-12 | End: 2021-05-10 | Stop reason: SDUPTHER

## 2021-04-12 NOTE — PROGRESS NOTES
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 ([address] )     She is know to be a hypometabolizer and as such, we have been avoiding giving her new medications  Chronic pain of both knees     Continue Voltaren  She does not want to take any nonsteroidal anti-inflammatory drugs  Hypercalcemia     Her calcium has been mildly intermittently elevated on several occasions  None of the readings are above 11  We will check a follow-up calcium level before her next visit along with a PTH level  Relevant Orders    Basic metabolic panel    PTH, intact      Other Visit Diagnoses     Encounter for immunization    -  Primary          Chief Complaint     Follow-up          Patient ID: Caden Bryan is a 77 y o  female who returns for routine follow up  Her home blood pressures have been up and down  She has readings 120s-189/60s-70s  On average, SBP is about 140s  She takes enalapril and felodipine for her blood pressure  She is off of omeprazole and hasn't had any problems with heartburn  She continues to have bilateral knee pain secondary to her arthritis  She is using topical Voltaren but only once or twice per day  She is wary of taking any nonsteroidal anti inflammatories      Objective:    /64 (BP Location: Right arm, Patient Position: Sitting, Cuff Size: Large)   Pulse 74   Temp (!) 96 9 °F (36 1 °C)   Ht 5' 1" (1 549 m)   Wt 54 4 kg (120 lb)   SpO2 98%   BMI 22 67 kg/m²     Wt Readings from Last 3 Encounters:   04/12/21 54 4 kg (120 lb)   10/12/20 51 9 kg (114 lb 6 4 oz)   04/13/20 47 2 kg (104 lb)         Physical Exam    General:  Well-developed, well-nourished, in no acute distress  Cardiac:  Regular rate and rhythm, no murmur, gallop, or rub  There is no JVD or HJR  Lungs:  Clear to auscultation and percussion  Abdomen:  Soft, nontender, normoactive bowel sounds, no palpable masses, no hepatosplenomegaly  Musculoskeletal: right knee flexion was limited to about 110 degrees with left knee flexion being about 125 degress  Bilateral knees have valgus laxity  Extremities:  No clubbing, cyanosis, or edema

## 2021-04-12 NOTE — ASSESSMENT & PLAN NOTE
Home blood pressures do not seem to be as well controlled  Will increase enalapril to 20 mg in the morning and continue 10 mg in the evening  If after 2 weeks blood pressure is not well controlled, would increase to 20 mg twice a day

## 2021-04-12 NOTE — PATIENT INSTRUCTIONS
Essential hypertension  Home blood pressures do not seem to be as well controlled  Will increase enalapril to 20 mg in the morning and continue 10 mg in the evening  If after 2 weeks blood pressure is not well controlled, would increase to 20 mg twice a day

## 2021-04-13 NOTE — ASSESSMENT & PLAN NOTE
Her calcium has been mildly intermittently elevated on several occasions  None of the readings are above 11  We will check a follow-up calcium level before her next visit along with a PTH level

## 2021-05-08 DIAGNOSIS — I10 ESSENTIAL HYPERTENSION: ICD-10-CM

## 2021-05-10 DIAGNOSIS — I10 ESSENTIAL HYPERTENSION: ICD-10-CM

## 2021-05-10 RX ORDER — ENALAPRIL MALEATE 10 MG/1
TABLET ORAL
Qty: 270 TABLET | Refills: 1 | Status: SHIPPED | OUTPATIENT
Start: 2021-05-10 | End: 2021-05-14 | Stop reason: SDUPTHER

## 2021-05-14 ENCOUNTER — TELEPHONE (OUTPATIENT)
Dept: FAMILY MEDICINE CLINIC | Facility: CLINIC | Age: 76
End: 2021-05-14

## 2021-05-14 DIAGNOSIS — I10 ESSENTIAL HYPERTENSION: ICD-10-CM

## 2021-05-14 RX ORDER — ENALAPRIL MALEATE 10 MG/1
20 TABLET ORAL 2 TIMES DAILY
Qty: 270 TABLET | Refills: 1 | COMMUNITY
Start: 2021-05-14 | End: 2021-07-12 | Stop reason: SDUPTHER

## 2021-05-14 NOTE — TELEPHONE ENCOUNTER
Reviewed her BPs  They have improved overall with most readings in the 383Z systolic but still getting readings in the 150s sometimes in the mornings 2 hours after her morning dose of enalapril  Not stressed out currently  Will raise evening dose of enalapril to 20 mg  Note: she has been on enalapril long-term and is prone to side effects from medications so we are going to stick with enalapril rather than switch her to lisinopril  She will sent in BPs in a couple of weeks

## 2021-05-21 RX ORDER — FELODIPINE 10 MG/1
10 TABLET, EXTENDED RELEASE ORAL
Qty: 90 TABLET | Refills: 1 | Status: SHIPPED | OUTPATIENT
Start: 2021-05-21 | End: 2021-07-26 | Stop reason: SDUPTHER

## 2021-06-01 ENCOUNTER — TELEMEDICINE (OUTPATIENT)
Dept: FAMILY MEDICINE CLINIC | Facility: CLINIC | Age: 76
End: 2021-06-01
Payer: COMMERCIAL

## 2021-06-01 VITALS — HEIGHT: 61 IN | BODY MASS INDEX: 22.66 KG/M2 | WEIGHT: 120 LBS

## 2021-06-01 DIAGNOSIS — R60.0 BILATERAL LOWER EXTREMITY EDEMA: Primary | ICD-10-CM

## 2021-06-01 PROCEDURE — 1036F TOBACCO NON-USER: CPT | Performed by: INTERNAL MEDICINE

## 2021-06-01 PROCEDURE — 3008F BODY MASS INDEX DOCD: CPT | Performed by: INTERNAL MEDICINE

## 2021-06-01 PROCEDURE — 1160F RVW MEDS BY RX/DR IN RCRD: CPT | Performed by: INTERNAL MEDICINE

## 2021-06-01 PROCEDURE — 99213 OFFICE O/P EST LOW 20 MIN: CPT | Performed by: INTERNAL MEDICINE

## 2021-06-01 NOTE — PROGRESS NOTES
Virtual Regular Visit      Assessment/Plan:    Problem List Items Addressed This Visit        Other    Bilateral lower extremity edema - Primary     Actually, both of her lower extremities are swollen though the right is more swollen than the left  It is unlikely that she has an acute DVT especially since she actually has no symptoms from the swelling itself  We did raise the dose of her enalapril a few weeks ago which in combination with felodipine and her venous insufficiency could certainly be the cause of edema  We are going to check liver function tests and a urinalysis  I recommended that she wear support stockings and to track her swelling to see if he actually gets worse during the day  I will get back with her after we have her lab results  We did discuss a diuretic but she was wary of taking hydrochlorothiazide because she did not tolerate this well in the past and furthermore, she has no symptoms  Relevant Orders    Hepatic function panel    Urinalysis with microscopic               Reason for visit is   Chief Complaint   Patient presents with    Leg Swelling     right ankle and leg swelling     Virtual Regular Visit        Encounter provider Pamela De MD    Provider located at [address] [place] [address] A  [address] [place] [id number]      Recent Visits  No visits were found meeting these conditions  Showing recent visits within past 7 days and meeting all other requirements     Today's Visits  Date Type Provider Dept   06/01/21 Telemedicine Pamela De, [address] [place] [address] Primary Care   Showing today's visits and meeting all other requirements     Future Appointments  No visits were found meeting these conditions  Showing future appointments within next 150 days and meeting all other requirements        The patient was identified by name and date of birth   Luke Anthony was informed that this is a telemedicine visit and that the visit is being conducted through [employer] App and patient was informed that this is not a secure, HIPAA-compliant platform  She agrees to proceed     My office door was closed  The patient was notified the following individuals were present in the [facility]  She acknowledged consent and understanding of privacy and security of the video platform  The patient has agreed to participate and understands they can discontinue the visit at any time  Patient is aware this is a billable service  Emiliana Trujillo is a 77 y o  female her chiropractor noticed that her chiropractor noticed that her right ankle was swollen  She wasn't aware of having any swelling and is not having any discomfort  She hasn't had any trauma and does not have a personal history of cancer, heart failure, recent surgical procedure or travel  HPI     Past Medical History:   Diagnosis Date    Anxiety     Cystocele, midline     GERD (gastroesophageal reflux disease)     Hypercholesterolemia     Hyperplastic colonic polyp 09/30/2005    Hypertension     essential     Infected cat bite of hand Approximately 2008     she was [facility] to [facility] for intravenous therapy after a cat bite became infected  No sequelae      Menopause     Osteoporosis     Palpitations     Proximal leg weakness 04/24/2017    Vitamin D deficiency        Past Surgical History:   Procedure Laterality Date    CHOLECYSTECTOMY      COMBINED HYSTEROSCOPY DIAGNOSTIC / D&C  1966, 1990    HYSTERECTOMY  1991    LITHOTRIPSY  1990    SKIN CANCER EXCISION      TEAR DUCT SURGERY  1996    TONSILLECTOMY  1950s    TOTAL ABDOMINAL HYSTERECTOMY W/ BILATERAL SALPINGOOPHORECTOMY  1991    TUBAL LIGATION      1997       Current Outpatient Medications   Medication Sig Dispense Refill    B Complex Vitamins (VITAMIN B COMPLEX) TABS Take 1 tablet by mouth 2 (two) times a day        Bioflavonoid Products (GABO-C) TABS Take 1 tablet by mouth 2 (two) times a day      clotrimazole-betamethasone (LOTRISONE) 1-0 05 % cream Apply to affected area 2 times daily as needed      Coenzyme Q10 (CO Q 10) 100 MG CAPS Take 1 tablet by mouth daily       enalapril (VASOTEC) 10 mg tablet Take 2 tablets (20 mg total) by mouth 2 (two) times a day 270 tablet 1    estradiol (ESTRACE) 0 1 mg/g vaginal cream Insert 2 g into the vagina every other day      estradiol (VAGIFEM, YUVAFEM) 10 MCG TABS vaginal tablet Insert 10 mcg into the vagina 2 (two) times a week       felodipine (PLENDIL) 10 MG 24 hr tablet TAKE 1 TABLET (10 MG TOTAL) BY MOUTH DAILY AT BEDTIME 90 tablet 1    Garlic 266 MG TBEC Take 1 tablet by mouth daily        Lecithin 1200 MG CAPS Take 1 capsule by mouth 3 (three) times a day        Magnesium Citrate 125 MG CAPS Take 125 mg by mouth every evening      Multiple Vitamins-Minerals (MULTIVITAMIN ADULT PO) Take 1 tablet by mouth daily        olopatadine (PATANOL) 0 1 % ophthalmic solution Administer 1 drop to both eyes 2 (two) times a day       sodium chloride (SHAKA 128) 2 % hypertonic ophthalmic solution Administer 1 drop to both eyes 2 (two) times a day        ACIDOPHILUS LACTOBACILLUS PO Take 1 tablet by mouth daily        diclofenac sodium (VOLTAREN) 1 % Apply 2 g topically 4 (four) times a day Apply to both knees (Patient not taking: Reported on 6/1/2021) 100 g 3    OMEGA-3 FATTY ACIDS PO Take 1 capsule by mouth 2 (two) times a day         No current facility-administered medications for this visit           Allergies   Allergen Reactions    Clindamycin      Clindamycin vaginal cream    Alendronate Other (See Comments)     Reaction: Lump in throat     Dextromethorphan Diarrhea, Dizziness and GI Intolerance    Gabapentin     Nitrofurantoin Other (See Comments)     Reaction: Fever, achy  Did okay on 3 day course of medication 2016    Other Other (See Comments)     *Illisone; *Noxema    Penicillins Other (See Comments)     Reaction: [GI]  Has taken ampicillin and did fine recently   2016    Prochlorperazine Other (See Comments)     Reaction: Muscle spasms    Pseudoephedrine Other (See Comments)     Reaction: heart racing    Sertraline Other (See Comments)     Reaction: Nose bleeds, Head tremors    Simvastatin Other (See Comments)     Reaction:[Derm]    Tetracyclines & Related Other (See Comments)     [GI]    Thiazide-Type Diuretics     Vancomycin     Amoxicillin-Pot Clavulanate Rash    Olopatadine Palpitations       Review of Systems    Video Exam    Vitals:    06/01/21 1044   Weight: 54 4 kg (120 lb)   Height: 5' 1" (1 549 m)       Physical Exam  Constitutional:       General: She is not in acute distress  Appearance: Normal appearance  She is not ill-appearing  Musculoskeletal:         General: Swelling ( Bilateral varicosities, she has pitting edema in both legs the right is greater than the left ) present  Neurological:      Mental Status: She is alert  I spent 12 minutes directly with the patient during this visit      [id number]  acknowledges that she has consented to an online visit or consultation  She understands that the online visit is based solely on information provided by her, and that, in the absence of a face-to-face physical evaluation by the physician, the diagnosis she receives is both limited and provisional in terms of accuracy and completeness  This is not intended to replace a full medical face-to-face evaluation by the physician  Dara Steele understands and accepts these terms

## 2021-06-04 ENCOUNTER — TELEPHONE (OUTPATIENT)
Dept: FAMILY MEDICINE CLINIC | Facility: CLINIC | Age: 76
End: 2021-06-04

## 2021-07-12 DIAGNOSIS — I10 ESSENTIAL HYPERTENSION: ICD-10-CM

## 2021-07-12 RX ORDER — ENALAPRIL MALEATE 10 MG/1
20 TABLET ORAL 2 TIMES DAILY
Qty: 270 TABLET | Refills: 1 | Status: SHIPPED | OUTPATIENT
Start: 2021-07-12 | End: 2021-11-26 | Stop reason: SDUPTHER

## 2021-07-12 NOTE — TELEPHONE ENCOUNTER
Patient called and wanted to let you know that her BP has been around 140/60 and wants to know if that is okay or if her medication should be adjusted

## 2021-07-12 NOTE — TELEPHONE ENCOUNTER
She is on the maximum dose of enalapril so we really can not adjust that medication any further  She is also on the maximum dose of felodipine  She is sensitive to many medications so we have to be careful about making any changes  We could start her on chlorthalidone 25 mg daily as it is not metabolized by CYP2D6  Let me know what she would like to do  <<-----Click on this checkbox to enter Procedure Abdominal perineal resection  12/06/2017    Active  [id number]

## 2021-07-26 DIAGNOSIS — I10 ESSENTIAL HYPERTENSION: ICD-10-CM

## 2021-07-27 RX ORDER — FELODIPINE 10 MG/1
10 TABLET, EXTENDED RELEASE ORAL
Qty: 90 TABLET | Refills: 1 | Status: SHIPPED | OUTPATIENT
Start: 2021-07-27 | End: 2021-10-24

## 2021-10-05 ENCOUNTER — TELEPHONE (OUTPATIENT)
Dept: ADMINISTRATIVE | Facility: OTHER | Age: 76
End: 2021-10-05

## 2021-10-08 ENCOUNTER — RA CDI HCC (OUTPATIENT)
Dept: OTHER | Facility: HOSPITAL | Age: 76
End: 2021-10-08

## 2021-10-18 ENCOUNTER — OFFICE VISIT (OUTPATIENT)
Dept: FAMILY MEDICINE CLINIC | Facility: CLINIC | Age: 76
End: 2021-10-18
Payer: COMMERCIAL

## 2021-10-18 VITALS
BODY MASS INDEX: 21.75 KG/M2 | OXYGEN SATURATION: 98 % | WEIGHT: 115.2 LBS | SYSTOLIC BLOOD PRESSURE: 168 MMHG | HEIGHT: 61 IN | HEART RATE: 76 BPM | TEMPERATURE: 99 F | DIASTOLIC BLOOD PRESSURE: 74 MMHG

## 2021-10-18 DIAGNOSIS — K21.9 GASTROESOPHAGEAL REFLUX DISEASE, UNSPECIFIED WHETHER ESOPHAGITIS PRESENT: ICD-10-CM

## 2021-10-18 DIAGNOSIS — Z23 NEEDS FLU SHOT: Primary | ICD-10-CM

## 2021-10-18 DIAGNOSIS — Z12.31 ENCOUNTER FOR SCREENING MAMMOGRAM FOR BREAST CANCER: ICD-10-CM

## 2021-10-18 DIAGNOSIS — E88.89 POOR DRUG METABOLIZER DUE TO CYTOCHROME P450 CYP2D6 VARIANT (HCC): ICD-10-CM

## 2021-10-18 DIAGNOSIS — I10 ESSENTIAL HYPERTENSION: ICD-10-CM

## 2021-10-18 DIAGNOSIS — Z12.11 ENCOUNTER FOR SCREENING COLONOSCOPY: ICD-10-CM

## 2021-10-18 DIAGNOSIS — E78.49 OTHER HYPERLIPIDEMIA: ICD-10-CM

## 2021-10-18 DIAGNOSIS — E83.52 HYPERCALCEMIA: ICD-10-CM

## 2021-10-18 PROCEDURE — 3077F SYST BP >= 140 MM HG: CPT | Performed by: INTERNAL MEDICINE

## 2021-10-18 PROCEDURE — 1170F FXNL STATUS ASSESSED: CPT | Performed by: INTERNAL MEDICINE

## 2021-10-18 PROCEDURE — 3725F SCREEN DEPRESSION PERFORMED: CPT | Performed by: INTERNAL MEDICINE

## 2021-10-18 PROCEDURE — 99214 OFFICE O/P EST MOD 30 MIN: CPT | Performed by: INTERNAL MEDICINE

## 2021-10-18 PROCEDURE — 3008F BODY MASS INDEX DOCD: CPT | Performed by: INTERNAL MEDICINE

## 2021-10-18 PROCEDURE — 1160F RVW MEDS BY RX/DR IN RCRD: CPT | Performed by: INTERNAL MEDICINE

## 2021-10-18 PROCEDURE — 1125F AMNT PAIN NOTED PAIN PRSNT: CPT | Performed by: INTERNAL MEDICINE

## 2021-10-18 PROCEDURE — 90662 IIV NO PRSV INCREASED AG IM: CPT | Performed by: INTERNAL MEDICINE

## 2021-10-18 PROCEDURE — 3288F FALL RISK ASSESSMENT DOCD: CPT | Performed by: INTERNAL MEDICINE

## 2021-10-18 PROCEDURE — G0008 ADMIN INFLUENZA VIRUS VAC: HCPCS | Performed by: INTERNAL MEDICINE

## 2021-10-18 PROCEDURE — G0439 PPPS, SUBSEQ VISIT: HCPCS | Performed by: INTERNAL MEDICINE

## 2021-10-18 PROCEDURE — 3078F DIAST BP <80 MM HG: CPT | Performed by: INTERNAL MEDICINE

## 2021-10-18 PROCEDURE — 1036F TOBACCO NON-USER: CPT | Performed by: INTERNAL MEDICINE

## 2021-11-15 ENCOUNTER — RA CDI HCC (OUTPATIENT)
Dept: OTHER | Facility: HOSPITAL | Age: 76
End: 2021-11-15

## 2021-11-22 ENCOUNTER — OFFICE VISIT (OUTPATIENT)
Dept: FAMILY MEDICINE CLINIC | Facility: CLINIC | Age: 76
End: 2021-11-22
Payer: COMMERCIAL

## 2021-11-22 VITALS
TEMPERATURE: 98 F | DIASTOLIC BLOOD PRESSURE: 62 MMHG | OXYGEN SATURATION: 98 % | HEART RATE: 85 BPM | BODY MASS INDEX: 21.9 KG/M2 | SYSTOLIC BLOOD PRESSURE: 122 MMHG | WEIGHT: 116 LBS | HEIGHT: 61 IN

## 2021-11-22 DIAGNOSIS — I10 ESSENTIAL HYPERTENSION: Primary | ICD-10-CM

## 2021-11-22 PROCEDURE — 1036F TOBACCO NON-USER: CPT | Performed by: INTERNAL MEDICINE

## 2021-11-22 PROCEDURE — 1160F RVW MEDS BY RX/DR IN RCRD: CPT | Performed by: INTERNAL MEDICINE

## 2021-11-22 PROCEDURE — 3078F DIAST BP <80 MM HG: CPT | Performed by: INTERNAL MEDICINE

## 2021-11-22 PROCEDURE — 99213 OFFICE O/P EST LOW 20 MIN: CPT | Performed by: INTERNAL MEDICINE

## 2021-11-22 PROCEDURE — 3008F BODY MASS INDEX DOCD: CPT | Performed by: INTERNAL MEDICINE

## 2021-11-22 PROCEDURE — 3074F SYST BP LT 130 MM HG: CPT | Performed by: INTERNAL MEDICINE

## 2021-11-22 RX ORDER — ENALAPRIL MALEATE 10 MG/1
20 TABLET ORAL 2 TIMES DAILY
Qty: 270 TABLET | Refills: 1 | Status: CANCELLED | OUTPATIENT
Start: 2021-11-22

## 2021-11-26 DIAGNOSIS — I10 ESSENTIAL HYPERTENSION: ICD-10-CM

## 2021-11-26 RX ORDER — ENALAPRIL MALEATE 10 MG/1
20 TABLET ORAL 2 TIMES DAILY
Qty: 270 TABLET | Refills: 1 | Status: SHIPPED | OUTPATIENT
Start: 2021-11-26 | End: 2022-01-26

## 2022-01-18 ENCOUNTER — VBI (OUTPATIENT)
Dept: ADMINISTRATIVE | Facility: OTHER | Age: 77
End: 2022-01-18

## 2022-01-25 ENCOUNTER — TELEPHONE (OUTPATIENT)
Dept: FAMILY MEDICINE CLINIC | Facility: CLINIC | Age: 77
End: 2022-01-25

## 2022-01-25 DIAGNOSIS — I10 ESSENTIAL HYPERTENSION: Primary | ICD-10-CM

## 2022-01-25 NOTE — TELEPHONE ENCOUNTER
Dangelo Almanza called with recent BP readings:    1/25 153/72 pulse 69    1/24 161/69 pulse 74    1/15 156/67 pulse 71    She is wondering what to about increased BP readings       Please advise

## 2022-01-26 DIAGNOSIS — I10 ESSENTIAL HYPERTENSION: ICD-10-CM

## 2022-01-26 RX ORDER — ENALAPRIL MALEATE 10 MG/1
20 TABLET ORAL 2 TIMES DAILY
Qty: 270 TABLET | Refills: 3 | Status: SHIPPED | OUTPATIENT
Start: 2022-01-26 | End: 2022-04-04 | Stop reason: SDUPTHER

## 2022-01-26 RX ORDER — FELODIPINE 10 MG/1
10 TABLET, EXTENDED RELEASE ORAL
Qty: 90 TABLET | Refills: 1 | Status: SHIPPED | OUTPATIENT
Start: 2022-01-26 | End: 2022-04-22

## 2022-01-26 RX ORDER — ATENOLOL 25 MG/1
25 TABLET ORAL DAILY
Qty: 30 TABLET | Refills: 5 | Status: SHIPPED | OUTPATIENT
Start: 2022-01-26 | End: 2022-03-08 | Stop reason: SDUPTHER

## 2022-01-26 NOTE — TELEPHONE ENCOUNTER
Apologies for delayed reply  I'm not really sure what to do about her blood pressure because she was hesitant to allow me to make any changes at her last visit  I called and spoke to Irineo  We had been trying to avoid adding new medications and any medications that might be metabolized by CYP 2 D 6 in particular  Given that she has some stressors, some of her blood pressure elevation may be related to increased and urine urgent drive  We are going to try her on low-dose of atenolol 25 mg daily since this is not metabolized in the liver  She is going to hold her enalapril and felodipine tonight and start the atenolol tomorrow morning  She will resume the other two blood pressure medications tomorrow night  She should follow her blood pressures at home and call in some readings after she has been on the atenolol for a while

## 2022-01-27 ENCOUNTER — TELEPHONE (OUTPATIENT)
Dept: FAMILY MEDICINE CLINIC | Facility: CLINIC | Age: 77
End: 2022-01-27

## 2022-01-27 NOTE — TELEPHONE ENCOUNTER
Yes  Because she takes the enalapril twice a day, she will need to take them together one of the times of the day  Alternatively, she can take 1 earlier in the evening and the other at bedtime

## 2022-01-27 NOTE — TELEPHONE ENCOUNTER
She can take enalapril with atenolol in the morning (or evening-- I'm a little confused about whether she started atenolol last night  I had told her to skip her enalapril and felodipine last night and start atenolol in the morning)

## 2022-01-27 NOTE — TELEPHONE ENCOUNTER
Patient wants to know if she should take atenolol and enalapril both tomorrow morning? Patient states enalapril was BID and atenolol was nightly

## 2022-01-28 ENCOUNTER — TELEPHONE (OUTPATIENT)
Dept: ADMINISTRATIVE | Facility: OTHER | Age: 77
End: 2022-01-28

## 2022-01-28 NOTE — TELEPHONE ENCOUNTER
----- Message from Rosalva Evans sent at 1/28/2022 10:21 AM EST -----  Regarding: Care Gap Request  01/28/22 10:21 AM    Hello, our patient attached above has had DEXA Scan completed/performed  Please assist in updating the patient chart by pulling the Care Everywhere (CE) document  The date of service is 2017       Thank you,  Best Trimble MA  [facility] PRIMARY [facility]

## 2022-01-28 NOTE — TELEPHONE ENCOUNTER
Upon review of the In Basket request we have found that the patient has not yet had the requested item completed or has not established care  Due to protocols, we are unable to hold requests for resulting/linking of a future items and are unable to proceed  Patients who have not established care within the Network do not have consents on file, record requests, etc     Any additional questions or concerns should be emailed to the Practice Liaisons via [email or web address] email, please do not reply via In Basket      Thank you  BENNETT Mercado I cannot locate dexa - Dexa's to [facility] [place] [facility] need to be done every two years Patient needs an order for a Dexa Scan

## 2022-02-10 ENCOUNTER — RA CDI HCC (OUTPATIENT)
Dept: OTHER | Facility: HOSPITAL | Age: 77
End: 2022-02-10

## 2022-02-10 NOTE — PROGRESS NOTES
[facility]  coding opportunities       Chart reviewed, no opportunity found: CHART REVIEWED, NO OPPORTUNITY FOUND                        Patients insurance company:  [employer] [facility] (Medicare Advantage and Commercial)

## 2022-02-21 ENCOUNTER — OFFICE VISIT (OUTPATIENT)
Dept: FAMILY MEDICINE CLINIC | Facility: CLINIC | Age: 77
End: 2022-02-21
Payer: COMMERCIAL

## 2022-02-21 VITALS
WEIGHT: 116 LBS | HEART RATE: 77 BPM | TEMPERATURE: 98 F | DIASTOLIC BLOOD PRESSURE: 70 MMHG | OXYGEN SATURATION: 98 % | BODY MASS INDEX: 21.9 KG/M2 | HEIGHT: 61 IN | SYSTOLIC BLOOD PRESSURE: 126 MMHG

## 2022-02-21 DIAGNOSIS — Z12.12 SCREENING FOR COLORECTAL CANCER: ICD-10-CM

## 2022-02-21 DIAGNOSIS — Z12.11 SCREENING FOR COLORECTAL CANCER: ICD-10-CM

## 2022-02-21 DIAGNOSIS — I10 ESSENTIAL HYPERTENSION: ICD-10-CM

## 2022-02-21 DIAGNOSIS — E88.89 POOR DRUG METABOLIZER DUE TO CYTOCHROME P450 CYP2D6 VARIANT (HCC): ICD-10-CM

## 2022-02-21 DIAGNOSIS — M81.8 OTHER OSTEOPOROSIS WITHOUT CURRENT PATHOLOGICAL FRACTURE: Primary | ICD-10-CM

## 2022-02-21 DIAGNOSIS — E78.49 OTHER HYPERLIPIDEMIA: ICD-10-CM

## 2022-02-21 DIAGNOSIS — Z12.31 ENCOUNTER FOR SCREENING MAMMOGRAM FOR BREAST CANCER: ICD-10-CM

## 2022-02-21 PROCEDURE — 3074F SYST BP LT 130 MM HG: CPT | Performed by: INTERNAL MEDICINE

## 2022-02-21 PROCEDURE — 1036F TOBACCO NON-USER: CPT | Performed by: INTERNAL MEDICINE

## 2022-02-21 PROCEDURE — 99214 OFFICE O/P EST MOD 30 MIN: CPT | Performed by: INTERNAL MEDICINE

## 2022-02-21 PROCEDURE — 3078F DIAST BP <80 MM HG: CPT | Performed by: INTERNAL MEDICINE

## 2022-02-21 PROCEDURE — 1160F RVW MEDS BY RX/DR IN RCRD: CPT | Performed by: INTERNAL MEDICINE

## 2022-02-21 NOTE — PROGRESS NOTES
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)  Noted  We are taking care to avoid medications that are metabolized by the CYP2D6 enzyme  Osteoporosis  Will check a DEXA  She was intolerant of alendronate in the past       Chief Complaint     Follow-up          Patient ID: Herber Escalante is a 77 y o  female who returns for routine follow up  She did started atenolol 25 mg daily on 1/26  Her home blood pressures have trended down over the past couple of weeks  This morning her BP was 117/54  Her pulse has been in the 50s -60s  Tolerating the atenolol well  She has been taking Cholesterolene for her cholesterol  Objective:    /70 (BP Location: Left arm, Patient Position: Sitting, Cuff Size: Standard)   Pulse 77   Temp 98 °F (36 7 °C)   Ht 5' 1" (1 549 m)   Wt 52 6 kg (116 lb)   SpO2 98%   BMI 21 92 kg/m²     Wt Readings from Last 3 Encounters:   02/21/22 52 6 kg (116 lb)   11/22/21 52 6 kg (116 lb)   10/18/21 52 3 kg (115 lb 3 2 oz)         Physical Exam    General:  Well-developed, well-nourished, in no acute distress  Cardiac:  Regular rate and rhythm, no murmur, gallop, or rub  There is no JVD or HJR  Lungs:  Clear to auscultation and percussion  Abdomen:  Soft, nontender, normoactive bowel sounds, no palpable masses, no hepatosplenomegaly  Extremities:  No clubbing, cyanosis, or edema

## 2022-02-21 NOTE — ASSESSMENT & PLAN NOTE
Dramatic response to the red yeast rice preparation she is taking  She seems to be tolerating it so she will continue this for now

## 2022-03-08 DIAGNOSIS — I10 ESSENTIAL HYPERTENSION: ICD-10-CM

## 2022-03-08 RX ORDER — ATENOLOL 25 MG/1
25 TABLET ORAL DAILY
Qty: 90 TABLET | Refills: 1 | Status: SHIPPED | OUTPATIENT
Start: 2022-03-08 | End: 2022-06-16

## 2022-04-04 DIAGNOSIS — I10 ESSENTIAL HYPERTENSION: ICD-10-CM

## 2022-04-04 RX ORDER — ENALAPRIL MALEATE 10 MG/1
20 TABLET ORAL 2 TIMES DAILY
Qty: 270 TABLET | Refills: 3 | Status: SHIPPED | OUTPATIENT
Start: 2022-04-04

## 2022-04-22 DIAGNOSIS — I10 ESSENTIAL HYPERTENSION: ICD-10-CM

## 2022-04-22 RX ORDER — FELODIPINE 10 MG/1
10 TABLET, EXTENDED RELEASE ORAL
Qty: 90 TABLET | Refills: 1 | Status: SHIPPED | OUTPATIENT
Start: 2022-04-22 | End: 2022-07-27 | Stop reason: SDUPTHER

## 2022-06-08 ENCOUNTER — TELEPHONE (OUTPATIENT)
Dept: FAMILY MEDICINE CLINIC | Facility: CLINIC | Age: 77
End: 2022-06-08

## 2022-06-08 DIAGNOSIS — E78.49 OTHER HYPERLIPIDEMIA: Primary | ICD-10-CM

## 2022-06-10 NOTE — PROGRESS NOTES
[facility]  Leticia Chin     Interventions: Outreach call placed to patient to discuss medication adherence with patient and methods to improve adherence   Unable to reach patient x 2        Reason for documentation: Patient was flagged by Third Party Payer and/or internal report as being due for a refill on the following medications: enalapril     Adherence Assessment:      CHERI-   · Enalapril 10 mg BID  · 98% PDC (goal > 80%    Reason For [facility] Pharmacist    Demographics  Interaction Method: Chart Review (EMR)  Type of Intervention: New    Topic(s) Addressed  Quality measures    Intervention(s) Made      Non-Pharmacologic:    -- Adherence addressed  -- Chart update    Tool(s) Used  Payer Portal    Time Spent:     Time Spent in Care Coordination: 10 minutes    Recommendations  Recipient: Not Applicable  Outcome: Not Applicable

## 2022-06-16 DIAGNOSIS — I10 ESSENTIAL HYPERTENSION: ICD-10-CM

## 2022-06-16 RX ORDER — ATENOLOL 25 MG/1
25 TABLET ORAL DAILY
Qty: 90 TABLET | Refills: 1 | Status: SHIPPED | OUTPATIENT
Start: 2022-06-16

## 2022-07-19 ENCOUNTER — RA CDI HCC (OUTPATIENT)
Dept: OTHER | Facility: HOSPITAL | Age: 77
End: 2022-07-19

## 2022-07-19 NOTE — PROGRESS NOTES
[facility]  coding opportunities       Chart reviewed, no opportunity found:   [address]        Patients Insurance     Medicare Insurance: [employer] Advantage

## 2022-07-27 DIAGNOSIS — I10 ESSENTIAL HYPERTENSION: ICD-10-CM

## 2022-07-27 RX ORDER — FELODIPINE 10 MG/1
10 TABLET, EXTENDED RELEASE ORAL
Qty: 90 TABLET | Refills: 1 | Status: SHIPPED | OUTPATIENT
Start: 2022-07-27 | End: 2022-08-29

## 2022-08-29 ENCOUNTER — OFFICE VISIT (OUTPATIENT)
Dept: FAMILY MEDICINE CLINIC | Facility: CLINIC | Age: 77
End: 2022-08-29
Payer: COMMERCIAL

## 2022-08-29 VITALS
OXYGEN SATURATION: 99 % | HEIGHT: 61 IN | BODY MASS INDEX: 22.36 KG/M2 | SYSTOLIC BLOOD PRESSURE: 124 MMHG | HEART RATE: 65 BPM | TEMPERATURE: 97.8 F | DIASTOLIC BLOOD PRESSURE: 80 MMHG | WEIGHT: 118.4 LBS

## 2022-08-29 DIAGNOSIS — I10 ESSENTIAL HYPERTENSION: ICD-10-CM

## 2022-08-29 DIAGNOSIS — E78.49 OTHER HYPERLIPIDEMIA: ICD-10-CM

## 2022-08-29 DIAGNOSIS — M81.8 OTHER OSTEOPOROSIS WITHOUT CURRENT PATHOLOGICAL FRACTURE: ICD-10-CM

## 2022-08-29 DIAGNOSIS — N39.0 URINARY TRACT INFECTION WITHOUT HEMATURIA, SITE UNSPECIFIED: ICD-10-CM

## 2022-08-29 DIAGNOSIS — Z12.11 ENCOUNTER FOR COLORECTAL CANCER SCREENING: Primary | ICD-10-CM

## 2022-08-29 DIAGNOSIS — E83.52 HYPERCALCEMIA: ICD-10-CM

## 2022-08-29 DIAGNOSIS — Z12.12 ENCOUNTER FOR COLORECTAL CANCER SCREENING: Primary | ICD-10-CM

## 2022-08-29 PROCEDURE — 1101F PT FALLS ASSESS-DOCD LE1/YR: CPT | Performed by: INTERNAL MEDICINE

## 2022-08-29 PROCEDURE — 3079F DIAST BP 80-89 MM HG: CPT | Performed by: INTERNAL MEDICINE

## 2022-08-29 PROCEDURE — 3074F SYST BP LT 130 MM HG: CPT | Performed by: INTERNAL MEDICINE

## 2022-08-29 PROCEDURE — 3288F FALL RISK ASSESSMENT DOCD: CPT | Performed by: INTERNAL MEDICINE

## 2022-08-29 PROCEDURE — 1160F RVW MEDS BY RX/DR IN RCRD: CPT | Performed by: INTERNAL MEDICINE

## 2022-08-29 PROCEDURE — 3725F SCREEN DEPRESSION PERFORMED: CPT | Performed by: INTERNAL MEDICINE

## 2022-08-29 PROCEDURE — 99214 OFFICE O/P EST MOD 30 MIN: CPT | Performed by: INTERNAL MEDICINE

## 2022-08-29 RX ORDER — FELODIPINE 10 MG/1
10 TABLET, EXTENDED RELEASE ORAL EVERY EVENING
Qty: 90 TABLET | Refills: 1 | Status: SHIPPED | OUTPATIENT
Start: 2022-08-29

## 2022-08-29 RX ORDER — ATENOLOL 25 MG/1
25 TABLET ORAL DAILY
Qty: 90 TABLET | Refills: 3 | Status: SHIPPED | OUTPATIENT
Start: 2022-08-29

## 2022-08-29 RX ORDER — KETOCONAZOLE 20 MG/ML
SHAMPOO TOPICAL
COMMUNITY
Start: 2022-07-15

## 2022-08-29 RX ORDER — AMOXICILLIN 500 MG/1
500 CAPSULE ORAL EVERY 8 HOURS SCHEDULED
Qty: 15 CAPSULE | Refills: 0
Start: 2022-08-29 | End: 2022-09-03

## 2022-08-29 NOTE — ASSESSMENT & PLAN NOTE
She has good blood pressure control here in the office  Will continue current regimen  I am fine with her taking the felodipine in the evening

## 2022-08-29 NOTE — PROGRESS NOTES
Assessment/Plan:    Essential hypertension  She has good blood pressure control here in the office  Will continue current regimen  I am fine with her taking the felodipine in the evening  Hypercalcemia  Her calcium seems to fluctuate between normal to mildly elevated  Her most recent calcium was 10 6  I would just repeat this before the next visit  Osteoporosis  We will schedule the DEXA for her  Other hyperlipidemia  Acceptable lipid panel  She has been intolerant of statins  I did give her a prescription for amoxicillin to use PRN for UTI instead of ciprofloxacin because of the association of tendinopathy and tendon rupture with ciprofloxacin  She wanted to know she could take biotin because her hair is thinning out  I did examine her scalp and do notice some slight thinning in a male pattern  No areas of patchy alopecia  I think biotin would probably be fine for her  Chief Complaint     Follow-up          Patient ID: Maggie Moreno is a 69 y o  female who returns for routine follow up  Her home BP's are running mostly in the 691-953 range systolic  She has had a few readings in the 150s in as high as 170  She related that she did not realize she was supposed to be taking her felodipine at bedtime instead of in the evening and she did so on one occasion and did feel very well after taking it  She went back to taking it in the evening and she is fine  She is tolerating the atenolol well  She had a recent UTI that was treated with Cipro and wanted to know she could have a prescription of Cipro on hand if she gets a UTI  She has taken amoxicillin in the past without difficulty even though she has listed an allergy to amoxicillin/clavulanate  She has not gotten her DEXA scheduled yet        Objective:    /80 (BP Location: Right arm, Patient Position: Sitting)   Pulse 65   Temp 97 8 °F (36 6 °C)   Ht 5' 1" (1 549 m)   Wt 53 7 kg (118 lb 6 4 oz)   SpO2 99%   BMI 22 37 kg/m²     Wt Readings from Last 3 Encounters:   08/29/22 53 7 kg (118 lb 6 4 oz)   02/21/22 52 6 kg (116 lb)   11/22/21 52 6 kg (116 lb)         Physical Exam    General:  Well-developed, well-nourished, in no acute distress  Cardiac:  Regular rate and rhythm, no murmur, gallop, or rub  There is no JVD or HJR  Lungs:  Clear to auscultation and percussion  Abdomen:  Soft, nontender, normoactive bowel sounds, no palpable masses, no hepatosplenomegaly  Extremities:  No clubbing, cyanosis, or edema

## 2022-08-29 NOTE — ASSESSMENT & PLAN NOTE
Her calcium seems to fluctuate between normal to mildly elevated  Her most recent calcium was 10 6  I would just repeat this before the next visit

## 2022-09-13 ENCOUNTER — VBI (OUTPATIENT)
Dept: ADMINISTRATIVE | Facility: OTHER | Age: 77
End: 2022-09-13

## 2022-09-29 DIAGNOSIS — M81.8 OTHER OSTEOPOROSIS WITHOUT CURRENT PATHOLOGICAL FRACTURE: ICD-10-CM

## 2022-10-26 ENCOUNTER — TELEPHONE (OUTPATIENT)
Dept: FAMILY MEDICINE CLINIC | Facility: CLINIC | Age: 77
End: 2022-10-26

## 2022-10-26 NOTE — TELEPHONE ENCOUNTER
Patient called reporting swelling in R ankle started this morning when she woke up, no injury to her ankle  Patient is asking if a diuretic would help   Please advise

## 2022-10-27 NOTE — TELEPHONE ENCOUNTER
Called patient, she is going to apply ace bandage will call office if swelling dose not improve   No further questions or concerns

## 2022-10-27 NOTE — TELEPHONE ENCOUNTER
I would have her put an ACE bandage on the ankle to see if the swelling improves  I want to avoid adding any medications because of her issue with not metabolizing medications well

## 2022-10-27 NOTE — TELEPHONE ENCOUNTER
Patient says it dose not hurt when she moves it, there was no trauma or injury, the swelling stops at the ankle area dose not go up her leg

## 2022-10-29 DIAGNOSIS — I10 ESSENTIAL HYPERTENSION: ICD-10-CM

## 2022-10-31 RX ORDER — ENALAPRIL MALEATE 10 MG/1
20 TABLET ORAL 2 TIMES DAILY
Qty: 270 TABLET | Refills: 3 | Status: SHIPPED | OUTPATIENT
Start: 2022-10-31

## 2022-10-31 RX ORDER — FELODIPINE 10 MG/1
TABLET, EXTENDED RELEASE ORAL
Qty: 90 TABLET | Refills: 3 | Status: SHIPPED | OUTPATIENT
Start: 2022-10-31

## 2022-11-17 ENCOUNTER — TELEPHONE (OUTPATIENT)
Dept: FAMILY MEDICINE CLINIC | Facility: CLINIC | Age: 77
End: 2022-11-17

## 2022-11-17 DIAGNOSIS — Z20.822 SUSPECTED COVID-19 VIRUS INFECTION: Primary | ICD-10-CM

## 2022-11-17 NOTE — TELEPHONE ENCOUNTER
Patient called stating she was seen [facility] today, 11/17/22  Tested positive for COVID  Onset of symptoms 11/16/22  She stated the Urgent Care doctor did not prescribe Paxlovid because he was questioning her other medications and interactions  She is asking if you can prescribe the RX and send it to [facility]

## 2022-11-18 RX ORDER — NIRMATRELVIR AND RITONAVIR 300-100 MG
3 KIT ORAL 2 TIMES DAILY
Qty: 30 TABLET | Refills: 0 | Status: SHIPPED | OUTPATIENT
Start: 2022-11-18 | End: 2022-11-23

## 2022-11-18 NOTE — TELEPHONE ENCOUNTER
The only interaction is with the felodipine  This may make her blood pressure drop a little bit  This is not a reason to hold Paxlovid  Please let her know that Paxlovid is metabolized by CY (not by CYP2D6 which is the pathway that she has problems with)  I had to send the prescription to [facility] in [facility] because [facility] does not carry it (according to a message I got from Epic)

## 2022-11-22 ENCOUNTER — TELEPHONE (OUTPATIENT)
Dept: FAMILY MEDICINE CLINIC | Facility: CLINIC | Age: 77
End: 2022-11-22

## 2022-11-22 DIAGNOSIS — N34.2 INFECTIVE URETHRITIS: Primary | ICD-10-CM

## 2022-11-22 RX ORDER — CIPROFLOXACIN 250 MG/1
250 TABLET, FILM COATED ORAL EVERY 12 HOURS SCHEDULED
Qty: 10 TABLET | Refills: 0 | Status: SHIPPED | OUTPATIENT
Start: 2022-11-22 | End: 2022-11-27

## 2022-11-22 NOTE — TELEPHONE ENCOUNTER
I sent in a prescription for Cipro to 50 mg twice a day for 5 days to the [facility]  She should avoid taking her supplements while on this medication

## 2022-11-22 NOTE — TELEPHONE ENCOUNTER
Patient called asking for medication to be sent to the pharmacy for a bladder infection with symptom onset of last night  Patient does not want in office appt as she currently has COVID  She would like it sent to [facility]

## 2022-11-23 ENCOUNTER — TELEPHONE (OUTPATIENT)
Dept: FAMILY MEDICINE CLINIC | Facility: CLINIC | Age: 77
End: 2022-11-23

## 2022-11-23 DIAGNOSIS — B37.31 CANDIDA VAGINITIS: Primary | ICD-10-CM

## 2022-11-23 RX ORDER — FLUCONAZOLE 150 MG/1
150 TABLET ORAL ONCE
Qty: 1 TABLET | Refills: 0 | Status: SHIPPED | OUTPATIENT
Start: 2022-11-23 | End: 2022-11-23

## 2022-11-23 NOTE — TELEPHONE ENCOUNTER
Patient called stating sometimes when she is on an ABX she gets a yeast infection  She would like to try and avoid that and is asking for an order of Diflucan be sent to [facility]

## 2022-12-15 ENCOUNTER — TELEPHONE (OUTPATIENT)
Dept: FAMILY MEDICINE CLINIC | Facility: CLINIC | Age: 77
End: 2022-12-15

## 2022-12-15 DIAGNOSIS — N39.0 RECURRENT UTI: Primary | ICD-10-CM

## 2022-12-15 NOTE — TELEPHONE ENCOUNTER
Patient called this morning stating she woke up with a possible urinary tract infection  She is asking for Ciprofloxacin to be sent to the [facility]

## 2022-12-15 NOTE — TELEPHONE ENCOUNTER
Patient did not want to provide urine sample with lab as she lives in [place] and did not want to drive out here just for that  She said she will go to an urgent care down there to be treated

## 2022-12-28 DIAGNOSIS — I10 ESSENTIAL HYPERTENSION: ICD-10-CM

## 2022-12-28 RX ORDER — ENALAPRIL MALEATE 10 MG/1
20 TABLET ORAL 2 TIMES DAILY
Qty: 270 TABLET | Refills: 3 | Status: SHIPPED | OUTPATIENT
Start: 2022-12-28

## 2023-01-16 DIAGNOSIS — I10 ESSENTIAL HYPERTENSION: ICD-10-CM

## 2023-01-16 RX ORDER — FELODIPINE 10 MG/1
10 TABLET, EXTENDED RELEASE ORAL
Qty: 90 TABLET | Refills: 3 | Status: SHIPPED | OUTPATIENT
Start: 2023-01-16

## 2023-02-19 ENCOUNTER — RA CDI HCC (OUTPATIENT)
Dept: OTHER | Facility: HOSPITAL | Age: 78
End: 2023-02-19

## 2023-02-19 NOTE — PROGRESS NOTES
[facility]  coding opportunities       Chart reviewed, no opportunity found:   [address]        Patients Insurance     Medicare Insurance: [employer]

## 2023-02-27 ENCOUNTER — OFFICE VISIT (OUTPATIENT)
Dept: FAMILY MEDICINE CLINIC | Facility: CLINIC | Age: 78
End: 2023-02-27

## 2023-02-27 VITALS
DIASTOLIC BLOOD PRESSURE: 68 MMHG | TEMPERATURE: 98.2 F | HEIGHT: 61 IN | SYSTOLIC BLOOD PRESSURE: 118 MMHG | BODY MASS INDEX: 22.28 KG/M2 | WEIGHT: 118 LBS | OXYGEN SATURATION: 98 % | HEART RATE: 78 BPM

## 2023-02-27 DIAGNOSIS — E78.49 OTHER HYPERLIPIDEMIA: ICD-10-CM

## 2023-02-27 DIAGNOSIS — E88.89 POOR DRUG METABOLIZER DUE TO CYTOCHROME P450 CYP2D6 VARIANT (HCC): ICD-10-CM

## 2023-02-27 DIAGNOSIS — K21.9 GASTROESOPHAGEAL REFLUX DISEASE, UNSPECIFIED WHETHER ESOPHAGITIS PRESENT: Primary | ICD-10-CM

## 2023-02-27 DIAGNOSIS — I10 ESSENTIAL HYPERTENSION: ICD-10-CM

## 2023-02-27 NOTE — ASSESSMENT & PLAN NOTE
Her current medications are being well-tolerated  We always double check to make sure that any new medications or not metabolized by cytochrome P450

## 2023-02-27 NOTE — PROGRESS NOTES
Assessment and Plan:     Problem List Items Addressed This Visit        Digestive    Gastroesophageal reflux disease - Primary     Well-controlled without a PPI now  Cardiovascular and Mediastinum    Essential hypertension     Her blood pressure is at goal here today  She had a few readings on her home BPs that were in the 150-160 range  As long as most of her blood pressures are well controlled, we will continue her on the current regimen  Relevant Orders    Basic metabolic panel       Other    Other hyperlipidemia     She has had a good response to red yeast rice and tolerates this better than she tolerated simvastatin  Relevant Orders    Lipid panel    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)     Her current medications are being well-tolerated  We always double check to make sure that any new medications or not metabolized by cytochrome P450  Preventive health issues were discussed with patient, and age appropriate screening tests were ordered as noted in patient's After Visit Summary  Personalized health advice and appropriate referrals for health education or preventive services given if needed, as noted in patient's After Visit Summary  History of Present Illness:     Patient presents for a Medicare Wellness Visit Home blood pressures are mostly 130s but she does have a few readings in the 140s, 150s and 1 was 160  She is taking atenolol, felodipine, and enalapril for her blood pressure  She is taking red yeast rice for her cholesterol which she is tolerating well  She has some left sacral pain which has been managed by her chiropractor  Wary of going to pain management      HPI   Patient Care Team:  Mercedes Mcmahan MD as PCP - General (Internal Medicine)     Review of Systems:     Review of Systems     Problem List:     Patient Active Problem List   Diagnosis   • Essential hypertension   • Gastroesophageal reflux disease   • Other hyperlipidemia   • Symptomatic menopausal or female climacteric states   • Osteoporosis   • 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)   • Midline cystocele   • Atrophic vaginitis   • Chronic pain of both knees   • Panic disorder   • Muscle weakness of lower extremity   • Nocturnal leg cramps   • Pain of left thumb   • Hypercalcemia   • Bilateral lower extremity edema      Past Medical and Surgical History:     Past Medical History:   Diagnosis Date   • Anxiety    • Cystocele, midline    • GERD (gastroesophageal reflux disease)    • Hypercholesterolemia    • Hyperplastic colonic polyp 09/30/2005   • Hypertension     essential    • Infected cat bite of hand Approximately 2008     she was admitted to [facility] for intravenous therapy after a cat bite became infected  No sequelae     • Menopause    • Osteoporosis    • Palpitations    • Proximal leg weakness 04/24/2017   • Vitamin D deficiency      Past Surgical History:   Procedure Laterality Date   • CHOLECYSTECTOMY     • COMBINED HYSTEROSCOPY DIAGNOSTIC / D&C  1966, 1990   • HYSTERECTOMY  1991   • LITHOTRIPSY  1990   • SKIN CANCER EXCISION     • TEAR DUCT SURGERY  1996   • TONSILLECTOMY  1950s   • TOTAL ABDOMINAL HYSTERECTOMY W/ BILATERAL SALPINGOOPHORECTOMY  1991   • TUBAL LIGATION      1997      Family History:     Family History   Problem Relation Age of Onset   • Heart disease Mother    • Hypertension Mother    • Stroke Mother    • Cancer Mother    • Breast cancer Father    • Coronary artery disease Father    • Hypertension Father    • Stroke Maternal Grandmother    • Heart disease Paternal Grandfather       Social History:     Social History     Socioeconomic History   • Marital status: /Civil Union     Spouse name: None   • Number of children: None   • Years of education: None   • Highest education level: None   Occupational History   • None   Tobacco Use   • Smoking status: Former     Packs/day: 2 50     Years: 3 00 Pack years: 7 50     Types: Cigarettes   • Smokeless tobacco: Never   Vaping Use   • Vaping Use: Never used   Substance and Sexual Activity   • Alcohol use: No   • Drug use: No   • Sexual activity: Not Currently   Other Topics Concern   • None   Social History Narrative   • None     Social Determinants of Health     Financial Resource Strain: Not on file   Food Insecurity: Not on file   Transportation Needs: Not on file   Physical Activity: Not on file   Stress: Not on file   Social Connections: Not on file   Intimate Partner Violence: Not on file   Housing Stability: Not on file      Medications and Allergies:     Current Outpatient Medications   Medication Sig Dispense Refill   • ACIDOPHILUS LACTOBACILLUS PO Take 1 tablet by mouth daily       • atenolol (TENORMIN) 25 mg tablet Take 1 tablet (25 mg total) by mouth daily 90 tablet 3   • B Complex Vitamins (VITAMIN B COMPLEX) TABS Take 1 tablet by mouth 2 (two) times a day       • Bioflavonoid Products (GABO-C) TABS Take 1 tablet by mouth 2 (two) times a day     • clotrimazole-betamethasone (LOTRISONE) 1-0 05 % cream Apply to affected area 2 times daily as needed     • Coenzyme Q10 (CO Q 10) 100 MG CAPS Take 1 tablet by mouth daily      • enalapril (VASOTEC) 10 mg tablet Take 2 tablets (20 mg total) by mouth 2 (two) times a day 270 tablet 3   • estradiol (ESTRACE) 0 1 mg/g vaginal cream Insert 2 g into the vagina every other day     • felodipine (PLENDIL) 10 MG 24 hr tablet Take 1 tablet (10 mg total) by mouth daily at bedtime 90 tablet 3   • ketoconazole (NIZORAL) 2 % shampoo      • Magnesium Citrate 125 MG CAPS Take 125 mg by mouth every evening     • Multiple Vitamins-Minerals (MULTIVITAMIN ADULT PO) Take 1 tablet by mouth daily       • olopatadine (PATANOL) 0 1 % ophthalmic solution Administer 1 drop to both eyes 2 (two) times a day      • Red Yeast Rice Extract (RED YEAST RICE PO) Take by mouth     • sodium chloride (SHAKA 128) 2 % hypertonic ophthalmic solution Administer 1 drop to both eyes 2 (two) times a day       • estradiol (VAGIFEM, YUVAFEM) 10 MCG TABS vaginal tablet Insert 10 mcg into the vagina 2 (two) times a week        No current facility-administered medications for this visit       Allergies   Allergen Reactions   • Clindamycin      Clindamycin vaginal cream   • Alendronate Other (See Comments)     Reaction: Lump in throat    • Dextromethorphan Diarrhea, Dizziness and GI Intolerance   • Gabapentin    • Nitrofurantoin Other (See Comments)     Reaction: Fever, achy  Did okay on 3 day course of medication 2016   • Other Other (See Comments)     *Illisone; *Noxema   • Penicillins Other (See Comments)     Reaction: [GI]  Has taken ampicillin and did fine recently   2016   • Prochlorperazine Other (See Comments)     Reaction: Muscle spasms   • Pseudoephedrine Other (See Comments)     Reaction: heart racing   • Sertraline Other (See Comments)     Reaction: Nose bleeds, Head tremors   • Simvastatin Other (See Comments)     Reaction:[Derm]   • Tetracyclines & Related Other (See Comments)     [GI]   • Thiazide-Type Diuretics    • Vancomycin    • Amoxicillin-Pot Clavulanate Rash   • Olopatadine Palpitations      Immunizations:     Immunization History   Administered Date(s) Administered   • COVID-19 MODERNA VACC 0 5 ML IM 01/29/2021, 02/26/2021, 10/29/2021, 05/03/2022   • COVID-19 Moderna Vac BIVALENT 12 Yr+ IM (BOOSTER ONLY) 0 5 ML 11/10/2022   • INFLUENZA 11/06/2013, 10/13/2014, 10/29/2015, 10/24/2016, 10/19/2017, 10/09/2018   • Influenza Quadrivalent Preservative Free 3 years and older IM 10/13/2014   • Influenza Recombinant Preservative Free Im 10/18/2021   • Influenza Split 11/03/2008, 11/09/2009, 11/01/2010, 10/06/2011, 11/01/2012, 11/06/2013   • Influenza Split High Dose Preservative Free IM 10/29/2015, 10/24/2016, 10/19/2017, 10/09/2018   • Influenza Split Preservative Free ID 11/03/2008, 11/09/2009, 11/01/2010, 10/06/2011, 11/01/2012, 11/06/2013   • Influenza, high dose seasonal 0 7 mL 10/14/2019, 10/12/2020, 10/18/2021   • Pneumococcal Conjugate 13-Valent 11/17/2015   • Pneumococcal Polysaccharide PPV23 10/06/2011   • Tdap 05/06/2008      Health Maintenance:         Topic Date Due   • Colorectal Cancer Screening  11/04/2020   • Breast Cancer Screening: Mammogram  06/28/2023   • DXA SCAN  09/28/2027   • Hepatitis C Screening  Completed         Topic Date Due   • Influenza Vaccine (1) 09/01/2022   • COVID-19 Vaccine (5 - Booster for Moderna series) 01/05/2023      Medicare Screening Tests and Risk Assessments: Sudeep Velazquez is here for her Subsequent Wellness visit  Last Medicare Wellness visit information reviewed, patient interviewed and updates made to the record today  Health Risk Assessment:   Patient rates overall health as good  Patient feels that their physical health rating is same  Patient is satisfied with their life  Eyesight was rated as same  Hearing was rated as same  Patient feels that their emotional and mental health rating is same  Patients states they are never, rarely angry  Patient states they are sometimes unusually tired/fatigued  Pain experienced in the last 7 days has been some  Patient's pain rating has been 4/10  Patient states that she has experienced no weight loss or gain in last 6 months  She has been going to the chiropractor for left lower sacral pain  Depression Screening:   PHQ-2 Score: 0      Fall Risk Screening: In the past year, patient has experienced: no history of falling in past year      Urinary Incontinence Screening:   Patient has not leaked urine accidently in the last six months  Home Safety:  Patient has trouble with stairs inside or outside of their home  Patient has working smoke alarms and has working carbon monoxide detector  Home safety hazards include: none  Nutrition:   Current diet is Regular  Medications:   Patient is currently taking over-the-counter supplements   OTC medications include: see medication list  Patient is able to manage medications  Activities of Daily Living (ADLs)/Instrumental Activities of Daily Living (IADLs):   Walk and transfer into and out of bed and chair?: Yes  Dress and groom yourself?: Yes    Bathe or shower yourself?: Yes    Feed yourself? Yes  Do your laundry/housekeeping?: Yes  Manage your money, pay your bills and track your expenses?: Yes  Make your own meals?: Yes    Do your own shopping?: Yes    Previous Hospitalizations:   Any hospitalizations or ED visits within the last 12 months?: No      Advance Care Planning:   Living will: Yes    Advanced directive: Yes    Advanced directive counseling given: Yes    End of Life Decisions reviewed with patient: Yes    Provider agrees with end of life decisions: Yes      Comments: Kaelyn Tee chooses comfort care measures in the event of a terminal diagnosis  Cognitive Screening:   Provider or family/friend/caregiver concerned regarding cognition?: No    PREVENTIVE SCREENINGS      Cardiovascular Screening:    General: Screening Not Indicated and History Lipid Disorder      Breast Cancer Screening:     General: Screening Current      Cervical Cancer Screening:    General: Screening Not Indicated      Osteoporosis Screening:    General: Screening Not Indicated and History Osteoporosis      Abdominal Aortic Aneurysm (AAA) Screening:        General: Screening Not Indicated      Lung Cancer Screening:     General: Screening Not Indicated      Hepatitis C Screening:    General: Screening Current    Screening, Brief Intervention, and Referral to Treatment (SBIRT)    Screening  Typical number of drinks in a day: 0  Typical number of drinks in a week: 0  Interpretation: Low risk drinking behavior      Single Item Drug Screening:  How often have you used an illegal drug (including marijuana) or a prescription medication for non-medical reasons in the past year? never    Single Item Drug Screen Score: 0  Interpretation: Negative screen for possible drug use disorder    No results found  Physical Exam:     /68 (BP Location: Left arm, Patient Position: Sitting, Cuff Size: Large)   Pulse 78   Temp 98 2 °F (36 8 °C)   Ht 5' 1" (1 549 m)   Wt 53 5 kg (118 lb)   SpO2 98%   BMI 22 30 kg/m²     Physical Exam  Constitutional:       General: She is not in acute distress  Appearance: She is well-developed  Neck:      Vascular: No JVD  Cardiovascular:      Rate and Rhythm: Normal rate and regular rhythm  Heart sounds: Normal heart sounds  No murmur heard  No friction rub  No gallop  Pulmonary:      Breath sounds: Normal breath sounds  Abdominal:      General: Bowel sounds are normal  There is no distension  Palpations: Abdomen is soft  There is no mass  Musculoskeletal:      Comments: Mild tenderness in the left sacral region            Frank Beltran MD

## 2023-02-27 NOTE — ASSESSMENT & PLAN NOTE
Her blood pressure is at goal here today  She had a few readings on her home BPs that were in the 150-160 range  As long as most of her blood pressures are well controlled, we will continue her on the current regimen

## 2023-02-27 NOTE — ASSESSMENT & PLAN NOTE
She has had a good response to red yeast rice and tolerates this better than she tolerated simvastatin

## 2023-02-28 ENCOUNTER — TELEPHONE (OUTPATIENT)
Dept: FAMILY MEDICINE CLINIC | Facility: CLINIC | Age: 78
End: 2023-02-28

## 2023-02-28 NOTE — TELEPHONE ENCOUNTER
Pt stated that the med dosage is 1200 mg capsules  Also stated that she takes (2) capsules per day at lunch time  Please advise

## 2023-02-28 NOTE — TELEPHONE ENCOUNTER
Pt called and stated that she was told to let provider know the name the OTC medication that she is taking      Med: Cholestene Red Yeast Rice    Please advise

## 2023-05-27 DIAGNOSIS — I10 ESSENTIAL HYPERTENSION: ICD-10-CM

## 2023-05-30 RX ORDER — ATENOLOL 25 MG/1
25 TABLET ORAL DAILY
Qty: 90 TABLET | Refills: 3 | Status: SHIPPED | OUTPATIENT
Start: 2023-05-30

## 2023-07-10 ENCOUNTER — TELEPHONE (OUTPATIENT)
Dept: FAMILY MEDICINE CLINIC | Facility: CLINIC | Age: 78
End: 2023-07-10

## 2023-07-10 DIAGNOSIS — Z12.31 SCREENING MAMMOGRAM FOR BREAST CANCER: Primary | ICD-10-CM

## 2023-07-10 NOTE — TELEPHONE ENCOUNTER
Needs order for her mammogram sent to [facility]    Routine bilateral mammogram script needed

## 2023-07-22 DIAGNOSIS — I10 ESSENTIAL HYPERTENSION: ICD-10-CM

## 2023-07-24 ENCOUNTER — TELEPHONE (OUTPATIENT)
Dept: FAMILY MEDICINE CLINIC | Facility: CLINIC | Age: 78
End: 2023-07-24

## 2023-07-24 RX ORDER — ENALAPRIL MALEATE 10 MG/1
20 TABLET ORAL 2 TIMES DAILY
Qty: 270 TABLET | Refills: 3 | Status: SHIPPED | OUTPATIENT
Start: 2023-07-24

## 2023-07-24 NOTE — TELEPHONE ENCOUNTER
Patient aware.
Patient called stating her left leg is swollen from the knee down. Onset was Friday 7/21/23. No improvement, not worsening. Denies redness, hotness, injury. I scheduled her for an appt tomorrow 07/25/23 at 1:00pm. Is there anything in the interim you would like her to do?
Try to keep the leg elevated is much as possible. Wearing an Ace bandage might help a little bit.
None

## 2023-07-25 ENCOUNTER — OFFICE VISIT (OUTPATIENT)
Dept: FAMILY MEDICINE CLINIC | Facility: CLINIC | Age: 78
End: 2023-07-25
Payer: COMMERCIAL

## 2023-07-25 VITALS
WEIGHT: 119.8 LBS | HEIGHT: 61 IN | TEMPERATURE: 97.8 F | DIASTOLIC BLOOD PRESSURE: 81 MMHG | OXYGEN SATURATION: 97 % | BODY MASS INDEX: 22.62 KG/M2 | SYSTOLIC BLOOD PRESSURE: 187 MMHG | HEART RATE: 68 BPM

## 2023-07-25 DIAGNOSIS — R60.0 BILATERAL LOWER EXTREMITY EDEMA: Primary | ICD-10-CM

## 2023-07-25 DIAGNOSIS — I10 ESSENTIAL HYPERTENSION: ICD-10-CM

## 2023-07-25 PROCEDURE — 99214 OFFICE O/P EST MOD 30 MIN: CPT | Performed by: INTERNAL MEDICINE

## 2023-07-25 RX ORDER — FUROSEMIDE 20 MG/1
20 TABLET ORAL DAILY
Qty: 30 TABLET | Refills: 5 | Status: SHIPPED | OUTPATIENT
Start: 2023-07-25

## 2023-07-25 NOTE — PROGRESS NOTES
Assessment/Plan:    Problem List Items Addressed This Visit        Cardiovascular and Mediastinum    Essential hypertension     Blood pressures not well controlled but I think that this could be related to excess volume. She tracks her blood pressures at home and we should see a drop in her blood pressure with the furosemide. Relevant Medications    furosemide (LASIX) 20 mg tablet       Other    Bilateral lower extremity edema - Primary     Interestingly, she had lower extremity edema about 2 years ago but then this resolved. She does not have any evidence of heart failure nor liver disease. We will send her for bilateral venous Dopplers, urinalysis, basic metabolic panel, hepatic function tests. Start furosemide 20 mg daily. I confirmed that this is not clear by CYP 2 D6. Relevant Medications    furosemide (LASIX) 20 mg tablet    Other Relevant Orders    VAS lower limb venous duplex study, complete bilateral    Urinalysis with microscopic    Basic metabolic panel    Hepatic function panel       Chief Complaint    Leg Swelling         Patient ID: Randee Mejias is a 69 y.o. female who returns for evaluation of leg swelling. She noticed the left leg was swollen last week (right leg a little as well). No dyspnea, PND, orthopnea. No changes in her medications. She is on felodipine for many years. Objective:    BP (!) 187/81 (BP Location: Left arm, Patient Position: Sitting, Cuff Size: Standard)   Pulse 68   Temp 97.8 °F (36.6 °C)   Ht 5' 1" (1.549 m)   Wt 54.3 kg (119 lb 12.8 oz)   SpO2 97%   BMI 22.64 kg/m²     Wt Readings from Last 3 Encounters:   07/25/23 54.3 kg (119 lb 12.8 oz)   02/27/23 53.5 kg (118 lb)   08/29/22 53.7 kg (118 lb 6.4 oz)         Physical Exam  Constitutional:       General: She is not in acute distress. Appearance: Normal appearance. She is well-developed. She is not ill-appearing. Neck:      Vascular: No hepatojugular reflux or JVD.    Cardiovascular: Rate and Rhythm: Normal rate and regular rhythm. Heart sounds: Normal heart sounds. No murmur heard. No friction rub. No gallop. Pulmonary:      Breath sounds: Normal breath sounds. Abdominal:      General: Bowel sounds are normal. There is no distension. Palpations: Abdomen is soft. There is no mass. Comments: No fluid wave, no hepatosplenomegaly. Musculoskeletal:         General: Swelling ( 2+ left leg 1+ right leg about two thirds the way up bilaterally) present. Neurological:      Mental Status: She is alert.

## 2023-07-25 NOTE — ASSESSMENT & PLAN NOTE
Blood pressures not well controlled but I think that this could be related to excess volume. She tracks her blood pressures at home and we should see a drop in her blood pressure with the furosemide.

## 2023-07-25 NOTE — ASSESSMENT & PLAN NOTE
Interestingly, she had lower extremity edema about 2 years ago but then this resolved. She does not have any evidence of heart failure nor liver disease. We will send her for bilateral venous Dopplers, urinalysis, basic metabolic panel, hepatic function tests. Start furosemide 20 mg daily. I confirmed that this is not clear by CYP 2 D6.

## 2023-07-28 ENCOUNTER — TELEPHONE (OUTPATIENT)
Dept: FAMILY MEDICINE CLINIC | Facility: CLINIC | Age: 78
End: 2023-07-28

## 2023-07-28 NOTE — TELEPHONE ENCOUNTER
Pt called and stated that she was told to let the provider know of her BP readings. Last night's reading  /63  Pulse 62    This morning readings  /71  Pulse 57    Pt stated that she is feeling well, but do not believe that the medication is working well bases on this morning's readings.     Please advise

## 2023-07-31 ENCOUNTER — HOSPITAL ENCOUNTER (OUTPATIENT)
Dept: NON INVASIVE DIAGNOSTICS | Facility: HOSPITAL | Age: 78
Discharge: HOME | End: 2023-07-31
Attending: INTERNAL MEDICINE
Payer: COMMERCIAL

## 2023-07-31 DIAGNOSIS — R60.0 BILATERAL LOWER EXTREMITY EDEMA: ICD-10-CM

## 2023-07-31 PROCEDURE — 93970 EXTREMITY STUDY: CPT

## 2023-07-31 PROCEDURE — 93970 EXTREMITY STUDY: CPT | Performed by: SURGERY

## 2023-08-08 DIAGNOSIS — R60.0 BILATERAL LOWER EXTREMITY EDEMA: ICD-10-CM

## 2023-08-08 NOTE — TELEPHONE ENCOUNTER
Telephone call from patient that her BP has still been about the same since increasing furosemide to 2x daily. She needs refills if you want to keep her on the same dose or she needs something else called in if you want to change it. Please advise.

## 2023-08-09 RX ORDER — FUROSEMIDE 80 MG
80 TABLET ORAL DAILY
Qty: 30 TABLET | Refills: 5 | Status: SHIPPED | OUTPATIENT
Start: 2023-08-09

## 2023-08-09 NOTE — TELEPHONE ENCOUNTER
Since 40 mg hasn't Reduced her swelling very much, lets increase the dose to 80 mg daily. I will send in a prescription for the 80 mg tablets to the [facility]. Please have her report back on Friday with both her blood pressures and with how her swelling is doing.

## 2023-08-11 ENCOUNTER — TELEPHONE (OUTPATIENT)
Dept: FAMILY MEDICINE CLINIC | Facility: CLINIC | Age: 78
End: 2023-08-11

## 2023-08-11 DIAGNOSIS — R60.0 BILATERAL LOWER EXTREMITY EDEMA: Primary | ICD-10-CM

## 2023-08-11 NOTE — TELEPHONE ENCOUNTER
Patient called with bp readings:     0830 08/10/23-  138/67, pulse 65  1440 08/10/23- 142/59, pulse 61  0800 08/11/23- 131/70, pulse 61      Asking if she is to continue to take furosemide 80mg tablets?

## 2023-08-11 NOTE — TELEPHONE ENCOUNTER
That's a pretty good response. Follow-up at the end of the month as scheduled. I would have her continue to take the diuretic at the current dose. I would like to check another basic metabolic panel a few days before her follow-up visit. I put the order in epic. We will probably have to fax it to the laboratory of her choice.

## 2023-08-11 NOTE — TELEPHONE ENCOUNTER
The coloring in her legs look better. The pain in her legs is gone. The swelling is gone in her right leg. There is still slight swelling in the left lower leg.

## 2023-08-21 DIAGNOSIS — Z12.31 SCREENING MAMMOGRAM FOR BREAST CANCER: ICD-10-CM

## 2023-08-28 ENCOUNTER — OFFICE VISIT (OUTPATIENT)
Dept: FAMILY MEDICINE CLINIC | Facility: CLINIC | Age: 78
End: 2023-08-28
Payer: COMMERCIAL

## 2023-08-28 VITALS
BODY MASS INDEX: 22.36 KG/M2 | SYSTOLIC BLOOD PRESSURE: 170 MMHG | HEIGHT: 61 IN | WEIGHT: 118.4 LBS | DIASTOLIC BLOOD PRESSURE: 68 MMHG | OXYGEN SATURATION: 97 % | HEART RATE: 64 BPM

## 2023-08-28 DIAGNOSIS — E78.49 OTHER HYPERLIPIDEMIA: ICD-10-CM

## 2023-08-28 DIAGNOSIS — I10 ESSENTIAL HYPERTENSION: Primary | ICD-10-CM

## 2023-08-28 DIAGNOSIS — E83.52 HYPERCALCEMIA: ICD-10-CM

## 2023-08-28 DIAGNOSIS — R60.0 BILATERAL LOWER EXTREMITY EDEMA: ICD-10-CM

## 2023-08-28 PROCEDURE — 99214 OFFICE O/P EST MOD 30 MIN: CPT | Performed by: INTERNAL MEDICINE

## 2023-08-28 RX ORDER — FUROSEMIDE 80 MG
40 TABLET ORAL DAILY
Qty: 30 TABLET | Refills: 5 | Status: SHIPPED | OUTPATIENT
Start: 2023-08-28

## 2023-08-28 RX ORDER — CHLORTHALIDONE 25 MG/1
25 TABLET ORAL DAILY
Qty: 30 TABLET | Refills: 5 | Status: SHIPPED | OUTPATIENT
Start: 2023-08-28

## 2023-08-28 NOTE — PROGRESS NOTES
Assessment/Plan:    Problem List Items Addressed This Visit        Cardiovascular and Mediastinum    Essential hypertension - Primary     Blood pressure still above goal.  We are going to add in chlorthalidone and decrease the dose of furosemide. Follow her blood pressures at home and call them in later this week. Relevant Medications    chlorthalidone 25 mg tablet    furosemide (LASIX) 80 mg tablet    Other Relevant Orders    Basic metabolic panel       Other    Other hyperlipidemia     Acceptable lipid panel. She has been intolerant to statins but does tolerate red yeast rice. Continue red yeast rice. Relevant Orders    Lipid panel    Hypercalcemia     Calcium was 10.7. This is likely related to diuretics. We will continue to monitor. Bilateral lower extremity edema     Regard to try decreasing the furosemide to 40 mg daily and and in chlorthalidone. This is not metabolized by CYP2D6 so she should be able to tolerate this medication. Relevant Medications    chlorthalidone 25 mg tablet    furosemide (LASIX) 80 mg tablet         Chief Complaint    Follow-up; Care Gap Request         Patient ID: Jordana Trammell is a 69 y.o. female who returns for routine follow up. She did start on the furosemide 20 mg daily. This did not produce any improvement in her edema so we increased her to 40 mg daily. This somewhat effective but her blood pressure was still elevated so we increased her to 80 mg daily. She did notice that her blood pressures did come down from the 160s into the 184-250 range systolic. However, she really minds the excessive diuresis. She apparently did not tolerate spironolactone in the past because of nausea. She had similar symptoms with hydrochlorothiazide many years ago.       Objective:    /68 (BP Location: Left arm)   Pulse 64   Ht 5' 1" (1.549 m)   Wt 53.7 kg (118 lb 6.4 oz)   SpO2 97%   BMI 22.37 kg/m²     Wt Readings from Last 3 Encounters: 08/28/23 53.7 kg (118 lb 6.4 oz)   07/25/23 54.3 kg (119 lb 12.8 oz)   02/27/23 53.5 kg (118 lb)         Physical Exam    General:  Well-developed, well-nourished, in no acute distress. Cardiac:  Regular rate and rhythm, no murmur, gallop, or rub. There is no JVD or HJR. Lungs:  Clear to auscultation and percussion. Abdomen:  Soft, nontender, normoactive bowel sounds, no palpable masses, no hepatosplenomegaly. Extremities: 2+ pitting edema bilateral lower extremities about longterm up her legs.

## 2023-08-28 NOTE — ASSESSMENT & PLAN NOTE
Regard to try decreasing the furosemide to 40 mg daily and and in chlorthalidone. This is not metabolized by CYP2D6 so she should be able to tolerate this medication.

## 2023-08-28 NOTE — ASSESSMENT & PLAN NOTE
Acceptable lipid panel. She has been intolerant to statins but does tolerate red yeast rice. Continue red yeast rice.

## 2023-08-28 NOTE — ASSESSMENT & PLAN NOTE
Blood pressure still above goal.  We are going to add in chlorthalidone and decrease the dose of furosemide. Follow her blood pressures at home and call them in later this week.

## 2023-10-15 DIAGNOSIS — I10 ESSENTIAL HYPERTENSION: ICD-10-CM

## 2023-10-16 RX ORDER — FELODIPINE 10 MG/1
10 TABLET, EXTENDED RELEASE ORAL
Qty: 90 TABLET | Refills: 3 | Status: SHIPPED | OUTPATIENT
Start: 2023-10-16

## 2023-10-23 ENCOUNTER — CLINICAL SUPPORT (OUTPATIENT)
Dept: FAMILY MEDICINE CLINIC | Facility: CLINIC | Age: 78
End: 2023-10-23
Payer: COMMERCIAL

## 2023-10-23 DIAGNOSIS — Z23 ENCOUNTER FOR IMMUNIZATION: Primary | ICD-10-CM

## 2023-10-23 DIAGNOSIS — R60.0 BILATERAL LOWER EXTREMITY EDEMA: ICD-10-CM

## 2023-10-23 PROCEDURE — 90662 IIV NO PRSV INCREASED AG IM: CPT

## 2023-10-23 PROCEDURE — G0008 ADMIN INFLUENZA VIRUS VAC: HCPCS

## 2023-10-23 RX ORDER — FUROSEMIDE 40 MG/1
40 TABLET ORAL DAILY
Qty: 90 TABLET | Refills: 3 | Status: SHIPPED | OUTPATIENT
Start: 2023-10-23

## 2023-10-23 NOTE — PROGRESS NOTES
Fluzone high dose administered IM in patient's left deltoid. Patient tolerated well. No further questions.

## 2023-12-11 DIAGNOSIS — R60.0 BILATERAL LOWER EXTREMITY EDEMA: ICD-10-CM

## 2023-12-11 DIAGNOSIS — G89.29 CHRONIC PAIN OF BOTH KNEES: ICD-10-CM

## 2023-12-11 DIAGNOSIS — M25.561 CHRONIC PAIN OF BOTH KNEES: ICD-10-CM

## 2023-12-11 DIAGNOSIS — I10 ESSENTIAL HYPERTENSION: ICD-10-CM

## 2023-12-11 DIAGNOSIS — M25.562 CHRONIC PAIN OF BOTH KNEES: ICD-10-CM

## 2023-12-11 DIAGNOSIS — M54.50 LOW BACK PAIN, UNSPECIFIED BACK PAIN LATERALITY, UNSPECIFIED CHRONICITY, UNSPECIFIED WHETHER SCIATICA PRESENT: Primary | ICD-10-CM

## 2023-12-11 RX ORDER — ENALAPRIL MALEATE 10 MG/1
20 TABLET ORAL 2 TIMES DAILY
Qty: 360 TABLET | Refills: 3 | Status: SHIPPED | OUTPATIENT
Start: 2023-12-11

## 2023-12-11 RX ORDER — CHLORTHALIDONE 25 MG/1
25 TABLET ORAL DAILY
Qty: 90 TABLET | Refills: 3 | Status: SHIPPED | OUTPATIENT
Start: 2023-12-11

## 2024-02-08 ENCOUNTER — TELEPHONE (OUTPATIENT)
Age: 79
End: 2024-02-08

## 2024-02-08 NOTE — TELEPHONE ENCOUNTER
Patient calling back to see if Dr. Curiel would add a RA panel suggested by her Chiropractor.    Patient requesting labs order be mailed to her home address.    Please review and advice  Thank you

## 2024-02-09 ENCOUNTER — DOCUMENTATION (OUTPATIENT)
Dept: FAMILY MEDICINE CLINIC | Facility: CLINIC | Age: 79
End: 2024-02-09

## 2024-02-09 DIAGNOSIS — M25.549 ARTHRALGIA OF HAND, UNSPECIFIED LATERALITY: Primary | ICD-10-CM

## 2024-02-12 NOTE — TELEPHONE ENCOUNTER
[facility] lab was calling to confirm pt info.  Order didn't have birth date   Confirmed lab work was order.

## 2024-02-29 ENCOUNTER — TELEPHONE (OUTPATIENT)
Dept: FAMILY MEDICINE CLINIC | Facility: CLINIC | Age: 79
End: 2024-02-29

## 2024-02-29 NOTE — TELEPHONE ENCOUNTER
----- Message from Robert Budinetz, MD sent at 2/29/2024  5:07 PM EST -----  Labs reviewed and are within normal limits.  Please inform the patient.

## 2024-03-04 ENCOUNTER — OFFICE VISIT (OUTPATIENT)
Dept: FAMILY MEDICINE CLINIC | Facility: CLINIC | Age: 79
End: 2024-03-04
Payer: COMMERCIAL

## 2024-03-04 VITALS
BODY MASS INDEX: 22.05 KG/M2 | WEIGHT: 116.8 LBS | HEIGHT: 61 IN | HEART RATE: 61 BPM | DIASTOLIC BLOOD PRESSURE: 60 MMHG | OXYGEN SATURATION: 98 % | SYSTOLIC BLOOD PRESSURE: 122 MMHG

## 2024-03-04 DIAGNOSIS — R60.0 BILATERAL LOWER EXTREMITY EDEMA: ICD-10-CM

## 2024-03-04 DIAGNOSIS — I10 ESSENTIAL HYPERTENSION: Primary | ICD-10-CM

## 2024-03-04 DIAGNOSIS — E83.52 HYPERCALCEMIA: ICD-10-CM

## 2024-03-04 DIAGNOSIS — G89.29 CHRONIC PAIN OF BOTH KNEES: ICD-10-CM

## 2024-03-04 DIAGNOSIS — M81.8 OTHER OSTEOPOROSIS WITHOUT CURRENT PATHOLOGICAL FRACTURE: ICD-10-CM

## 2024-03-04 DIAGNOSIS — E88.89 POOR DRUG METABOLIZER DUE TO CYTOCHROME P450 CYP2D6 VARIANT (HCC): ICD-10-CM

## 2024-03-04 DIAGNOSIS — M25.561 CHRONIC PAIN OF BOTH KNEES: ICD-10-CM

## 2024-03-04 DIAGNOSIS — E78.49 OTHER HYPERLIPIDEMIA: ICD-10-CM

## 2024-03-04 DIAGNOSIS — M25.562 CHRONIC PAIN OF BOTH KNEES: ICD-10-CM

## 2024-03-04 PROCEDURE — G0439 PPPS, SUBSEQ VISIT: HCPCS | Performed by: INTERNAL MEDICINE

## 2024-03-04 PROCEDURE — 99214 OFFICE O/P EST MOD 30 MIN: CPT | Performed by: INTERNAL MEDICINE

## 2024-03-04 NOTE — PROGRESS NOTES
Assessment & Plan  1. Hypertension.  Her blood pressure looks well controlled. She will continue on the combination of atenolol, chlorthalidone, enalapril, and felodipine. She will keep track of her blood pressures at home.    2. Lower extremity edema.  This seems to be under good control with the furosemide 40 mg. We will continue to monitor her kidney function and potassium.    3. Osteoporosis.  We already decided she did not want to pursue any further treatment. She will try to walk as much as she can and take a calcium and vitamin D supplement as well.    4. Knee pain.  She is going to see the orthopedic doctor soon.    5. Hypercalcemia.  Her repeat calcium was normal. We will just keep following it.    6. Hyperlipidema.  Her lipids are looking better. She will continue on the red yeast rice. I will obtain blood work.    7.  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  She knows that we will always check the metabolism pathway for any new medications that we prescribed.  Follow-up  The patient will follow up in 6 months.        Preventive health issues were discussed with patient, and age appropriate screening tests were ordered as noted in patient's After Visit Summary.  Personalized health advice and appropriate referrals for health education or preventive services given if needed, as noted in patient's After Visit Summary.     History of Present Illness:     Patient presents for a Medicare Wellness Visit    History of Present Illness  The patient is a 78-year-old female who presents for evaluation of multiple medical concerns. She is accompanied by her .    Her right knee bothers her some. She has an appointment with Dr. Yariel Michaels on 03/15/2024. She thinks it is arthritis. She had an x-ray of her knees about 2 years ago. Her knee is tender. She has been keeping an exercise to keep it open and not freezing up.    She was started on chlorthalidone in 08/2023. She  is tolerating the medication well. She is taking atenolol 25 mg once a day, chlorthalidone 25 mg once a day, enalapril 10 mg tablets 2 tablets twice a day, and felodipine 10 mg once a day. She takes Lasix every day except Thursday because she goes to the chiropractor. She is on red yeast rice 2000 mg once a day.    She did not tolerate Fosamax. She felt like food got stuck in her throat all the time.    Her last colonoscopy was in 2015. She does not want to go through another gastroenterologist. She inquired about Cologuard.    She has received the respiratory syncytial virus vaccine.She has already got the chair lift.        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)    Midline cystocele    Atrophic vaginitis    Chronic pain of both knees    Panic disorder    Muscle weakness of lower extremity    Nocturnal leg cramps    Pain of left thumb    Hypercalcemia    Bilateral lower extremity edema    Low back pain, unspecified back pain laterality, unspecified chronicity, unspecified whether sciatica present      Past Medical and Surgical History:     Past Medical History:   Diagnosis Date    Anxiety     Cystocele, midline     GERD (gastroesophageal reflux disease)     Hypercholesterolemia     Hyperplastic colonic polyp 09/30/2005    Hypertension     essential     Infected cat bite of hand Approximately 2008     she was admitted to [facility] for intravenous therapy after a cat bite became infected.  No sequelae.    Menopause     Osteoporosis     Palpitations     Proximal leg weakness 04/24/2017    Vitamin D deficiency      Past Surgical History:   Procedure Laterality Date    CHOLECYSTECTOMY      COMBINED HYSTEROSCOPY DIAGNOSTIC / D&C   1966, 1990    HYSTERECTOMY  1991    LITHOTRIPSY  1990    SKIN CANCER EXCISION      TEAR DUCT SURGERY  1996    TONSILLECTOMY  1950s    TOTAL ABDOMINAL HYSTERECTOMY W/ BILATERAL SALPINGOOPHORECTOMY  1991    TUBAL LIGATION      1997      Family History:     Family History   Problem Relation Age of Onset    Heart disease Mother     Hypertension Mother     Stroke Mother     Cancer Mother     Breast cancer Father     Coronary artery disease Father     Hypertension Father     Stroke Maternal Grandmother     Heart disease Paternal Grandfather       Social History:     Social History     Socioeconomic History    Marital status: /Civil Union     Spouse name: None    Number of children: None    Years of education: None    Highest education level: None   Occupational History    None   Tobacco Use    Smoking status: Former     Current packs/day: 2.50     Average packs/day: 2.5 packs/day for 3.0 years (7.5 ttl pk-yrs)     Types: Cigarettes    Smokeless tobacco: Never   Vaping Use    Vaping status: Never Used   Substance and Sexual Activity    Alcohol use: No    Drug use: No    Sexual activity: Not Currently   Other Topics Concern    None   Social History Narrative    None     Social Determinants of Health     Financial Resource Strain: Low Risk  (3/4/2024)    Overall Financial Resource Strain (CARDIA)     Difficulty of Paying Living Expenses: Not hard at all   Food Insecurity: Not on file   Transportation Needs: No Transportation Needs (3/4/2024)    PRAPARE - Transportation     Lack of Transportation (Medical): No     Lack of Transportation (Non-Medical): No   Physical Activity: Not on file   Stress: Not on file   Social Connections: Not on file   Intimate Partner Violence: Not on file   Housing Stability: Not on file      Medications and Allergies:     Current Outpatient Medications   Medication Sig Dispense Refill    ACIDOPHILUS LACTOBACILLUS PO Take 1 tablet by mouth daily        atenolol (TENORMIN) 25 mg tablet TAKE 1  TABLET (25 MG TOTAL) BY MOUTH DAILY 90 tablet 3    B Complex Vitamins (VITAMIN B COMPLEX) TABS Take 1 tablet by mouth 2 (two) times a day        Bioflavonoid Products (GABO-C) TABS Take 1 tablet by mouth 2 (two) times a day      chlorthalidone 25 mg tablet Take 1 tablet (25 mg total) by mouth daily 90 tablet 3    clotrimazole-betamethasone (LOTRISONE) 1-0.05 % cream Apply to affected area 2 times daily as needed      Coenzyme Q10 (CO Q 10) 100 MG CAPS Take 1 tablet by mouth daily       enalapril (VASOTEC) 10 mg tablet Take 2 tablets (20 mg total) by mouth 2 (two) times a day 360 tablet 3    estradiol (ESTRACE) 0.1 mg/g vaginal cream Insert 2 g into the vagina every other day      estradiol (VAGIFEM, YUVAFEM) 10 MCG TABS vaginal tablet Insert 10 mcg into the vagina 2 (two) times a week       felodipine (PLENDIL) 10 MG 24 hr tablet TAKE 1 TABLET (10 MG TOTAL) BY MOUTH DAILY AT BEDTIME 90 tablet 3    furosemide (LASIX) 40 mg tablet Take 1 tablet (40 mg total) by mouth daily 90 tablet 3    ketoconazole (NIZORAL) 2 % shampoo       Magnesium Citrate 125 MG CAPS Take 125 mg by mouth every evening      Multiple Vitamins-Minerals (MULTIVITAMIN ADULT PO) Take 1 tablet by mouth daily        olopatadine (PATANOL) 0.1 % ophthalmic solution Administer 1 drop to both eyes 2 (two) times a day       Red Yeast Rice Extract (RED YEAST RICE PO) Take 2,000 mg by mouth in the morning      sodium chloride (SHAKA 128) 2 % hypertonic ophthalmic solution Administer 1 drop to both eyes 2 (two) times a day         No current facility-administered medications for this visit.     Allergies   Allergen Reactions    Clindamycin      Clindamycin vaginal cream    Alendronate Other (See Comments)     Reaction: Lump in throat     Dextromethorphan Diarrhea, Dizziness and GI Intolerance    Gabapentin     Nitrofurantoin Other (See Comments)     Reaction: Fever, achy  Did okay on 3 day course of medication 2016    Other Other (See Comments)     *Illisone;  *Noxema    Penicillins Other (See Comments)     Reaction: [GI]  Has taken ampicillin and did fine recently.  2016    Prochlorperazine Other (See Comments)     Reaction: Muscle spasms    Pseudoephedrine Other (See Comments)     Reaction: heart racing    Sertraline Other (See Comments)     Reaction: Nose bleeds, Head tremors    Simvastatin Other (See Comments)     Reaction:[Derm]    Tetracyclines & Related Other (See Comments)     [GI]    Thiazide-Type Diuretics     Vancomycin     Amoxicillin-Pot Clavulanate Rash    Olopatadine Palpitations      Immunizations:     Immunization History   Administered Date(s) Administered    COVID-19 MODERNA VACC 0.5 ML IM 01/29/2021, 02/26/2021, 10/29/2021, 05/03/2022    COVID-19 Moderna Vac BIVALENT 12 Yr+ IM 0.5 ML 11/10/2022    INFLUENZA 11/06/2013, 10/13/2014, 10/29/2015, 10/24/2016, 10/19/2017, 10/09/2018    Influenza Quadrivalent Preservative Free 3 years and older IM 10/13/2014    Influenza Recombinant Preservative Free Im 10/18/2021    Influenza Split 11/03/2008, 11/09/2009, 11/01/2010, 10/06/2011, 11/01/2012, 11/06/2013    Influenza Split High Dose Preservative Free IM 10/29/2015, 10/24/2016, 10/19/2017, 10/09/2018    Influenza Split Preservative Free ID 11/03/2008, 11/09/2009, 11/01/2010, 10/06/2011, 11/01/2012, 11/06/2013    Influenza, high dose seasonal 0.7 mL 10/14/2019, 10/12/2020, 10/18/2021, 10/23/2023    Pneumococcal Conjugate 13-Valent 11/17/2015    Pneumococcal Polysaccharide PPV23 10/06/2011    Tdap 05/06/2008      Health Maintenance:         Topic Date Due    Colorectal Cancer Screening  11/04/2020    Breast Cancer Screening: Mammogram  08/15/2024    DXA SCAN  09/28/2027    Hepatitis C Screening  Completed         Topic Date Due    COVID-19 Vaccine (6 - 2023-24 season) 09/01/2023      Medicare Screening Tests and Risk Assessments:     Velma is here for her Subsequent Wellness visit.     Health Risk Assessment:   Patient rates overall health as good. Patient feels  that their physical health rating is same. Patient is satisfied with their life. Eyesight was rated as slightly worse. Hearing was rated as slightly worse. Patient feels that their emotional and mental health rating is same. Patients states they are never, rarely angry. Patient states they are never, rarely unusually tired/fatigued. Pain experienced in the last 7 days has been some. Patient's pain rating has been 4/10. Patient states that she has experienced no weight loss or gain in last 6 months. Right knee pain secondary to arthritis.    Depression Screening:   PHQ-2 Score: 0      Fall Risk Screening:   In the past year, patient has experienced: no history of falling in past year      Urinary Incontinence Screening:   Patient has not leaked urine accidently in the last six months.     Home Safety:  Patient has trouble with stairs inside or outside of their home. Patient has working smoke alarms and has working carbon monoxide detector. Home safety hazards include: none. Has difficulty on the stairs secondary to knee pain.    Nutrition:   Current diet is Regular.     Medications:   Patient is currently taking over-the-counter supplements. OTC medications include: see medication list. Patient is able to manage medications.     Activities of Daily Living (ADLs)/Instrumental Activities of Daily Living (IADLs):   Walk and transfer into and out of bed and chair?: Yes  Dress and groom yourself?: Yes    Bathe or shower yourself?: Yes    Feed yourself? Yes  Do your laundry/housekeeping?: Yes  Manage your money, pay your bills and track your expenses?: Yes  Make your own meals?: Yes    Do your own shopping?: Yes    Durable Medical Equipment Suppliers  None    Previous Hospitalizations:   Any hospitalizations or ED visits within the last 12 months?: No      Advance Care Planning:   Living will: No    Durable POA for healthcare: Yes    Advanced directive: No    Advanced directive counseling given: Yes    End of Life  "Decisions reviewed with patient: Yes    Provider agrees with end of life decisions: Yes      Comments: Velma chooses comfort care measures in the event of a terminal diagnosis.       Cognitive Screening:   Provider or family/friend/caregiver concerned regarding cognition?: No    PREVENTIVE SCREENINGS      Cardiovascular Screening:    General: Screening Not Indicated and History Lipid Disorder      Diabetes Screening:     General: Screening Current      Colorectal Cancer Screening:     General: Screening Current      Breast Cancer Screening:     General: Screening Current      Cervical Cancer Screening:    General: Screening Not Indicated      Osteoporosis Screening:    General: Screening Not Indicated and History Osteoporosis      Abdominal Aortic Aneurysm (AAA) Screening:        General: Screening Not Indicated      Lung Cancer Screening:     General: Screening Not Indicated      Hepatitis C Screening:    General: Screening Current    Screening, Brief Intervention, and Referral to Treatment (SBIRT)    Screening  Typical number of drinks in a day: 0  Typical number of drinks in a week: 0  Interpretation: Low risk drinking behavior.    AUDIT-C Screenin) How often did you have a drink containing alcohol in the past year? never  2) How many drinks did you have on a typical day when you were drinking in the past year? 0  3) How often did you have 6 or more drinks on one occasion in the past year? never    AUDIT-C Score: 0  Interpretation: Score 0-2 (female): Negative screen for alcohol misuse    Single Item Drug Screening:  How often have you used an illegal drug (including marijuana) or a prescription medication for non-medical reasons in the past year? never    Single Item Drug Screen Score: 0  Interpretation: Negative screen for possible drug use disorder    No results found.     Physical Exam:     /60 (BP Location: Right arm, Patient Position: Sitting, Cuff Size: Standard)   Pulse 61   Ht 5' 1\" (1.549 m) "   Wt 53 kg (116 lb 12.8 oz)   SpO2 98%   BMI 22.07 kg/m²     Physical Exam  Constitutional:       General: She is not in acute distress.     Appearance: Normal appearance. She is well-developed. She is not ill-appearing.   Neck:      Vascular: No JVD.   Cardiovascular:      Rate and Rhythm: Normal rate and regular rhythm.      Heart sounds: Normal heart sounds. No murmur heard.     No friction rub. No gallop.   Pulmonary:      Breath sounds: Normal breath sounds.   Abdominal:      General: Bowel sounds are normal. There is no distension.      Palpations: Abdomen is soft. There is no mass.   Musculoskeletal:         General: No swelling.   Neurological:      Mental Status: She is alert.

## 2024-04-01 ENCOUNTER — APPOINTMENT (OUTPATIENT)
Dept: RADIOLOGY | Facility: CLINIC | Age: 79
End: 2024-04-01
Payer: COMMERCIAL

## 2024-04-01 ENCOUNTER — OFFICE VISIT (OUTPATIENT)
Dept: FAMILY MEDICINE CLINIC | Facility: CLINIC | Age: 79
End: 2024-04-01
Payer: COMMERCIAL

## 2024-04-01 VITALS
WEIGHT: 118 LBS | DIASTOLIC BLOOD PRESSURE: 68 MMHG | SYSTOLIC BLOOD PRESSURE: 142 MMHG | BODY MASS INDEX: 22.28 KG/M2 | HEIGHT: 61 IN | OXYGEN SATURATION: 98 % | HEART RATE: 86 BPM

## 2024-04-01 DIAGNOSIS — S16.1XXA STRAIN OF NECK MUSCLE, INITIAL ENCOUNTER: Primary | ICD-10-CM

## 2024-04-01 DIAGNOSIS — S16.1XXA STRAIN OF NECK MUSCLE, INITIAL ENCOUNTER: ICD-10-CM

## 2024-04-01 PROCEDURE — G2211 COMPLEX E/M VISIT ADD ON: HCPCS | Performed by: INTERNAL MEDICINE

## 2024-04-01 PROCEDURE — 72050 X-RAY EXAM NECK SPINE 4/5VWS: CPT

## 2024-04-01 PROCEDURE — 99213 OFFICE O/P EST LOW 20 MIN: CPT | Performed by: INTERNAL MEDICINE

## 2024-04-01 RX ORDER — LANSOPRAZOLE 30 MG/1
30 CAPSULE, DELAYED RELEASE ORAL DAILY
COMMUNITY

## 2024-04-01 RX ORDER — NAPROXEN 500 MG/1
500 TABLET ORAL 2 TIMES DAILY WITH MEALS
Qty: 60 TABLET | Refills: 1 | Status: SHIPPED | OUTPATIENT
Start: 2024-04-01

## 2024-04-01 RX ORDER — IBUPROFEN 400 MG/1
400 TABLET ORAL EVERY 4 HOURS PRN
COMMUNITY
Start: 2024-03-19

## 2024-04-01 NOTE — PROGRESS NOTES
Name: Velma Weeks      : 1945      MRN: [id number]  Encounter Provider: Steven Curiel MD  Encounter Date: 2024   Encounter department: [facility]    Assessment & Plan     Assessment & Plan  1. Cervicalgia.  The patient's neck mobility is suboptimal, suggesting a possible diagnosis of cervical arthritis. An x-ray of the cervical spine will be obtained today. The patient is advised to persist with the application of ice and heat, and to consider the use of Icy Hot as required. A prescription for naproxen 500 mg, to be taken twice daily, will be provided. A referral for physical therapy will be made. The patient is also advised to continue to take Prevacid 30 mg in the morning. Should there be no improvement in her condition following the initiation of physical therapy, the patient is instructed to return for further evaluation.    Follow-up  The patient is scheduled for a follow-up appointment in 2024 or sooner as needed.         Subjective      History of Present Illness  The patient is a 78-year-old female who presents for evaluation of neck and shoulder blade pain. She is accompanied by her .    The patient reports experiencing pain in her neck and left shoulder blade, which intensifies when she attempts to bend down and maintain movement. This pain initiated approximately 2 weeks ago, with no preceding precipitating events or changes in her exercise regimen. The pain, which is most severe upon waking, is constant throughout the day. She has been managing the pain with Advil, taking 2 tablets at a time, which provides relief for approximately 4 hours, supplemented by a 4-hour regimen. Additionally, she has been applying moist heat, ice, and Biofreeze. She denies experiencing any shooting pains, numbness, tingling, or weakness in her arms.  Her  palpated her left shoulder blade and reports a palpable knot in the muscle. She has no prior history of neck  "issues.        Objective     /68 (BP Location: Right arm, Patient Position: Sitting, Cuff Size: Standard)   Pulse 86   Ht 5' 1\" (1.549 m)   Wt 53.5 kg (118 lb)   SpO2 98%   BMI 22.30 kg/m²     Physical Exam    Physical Exam  Constitutional:       Appearance: Normal appearance.      Comments: She does look uncomfortable today.   Neck:      Comments: Normal flexion, limited extension and left lateral rotation and left lateral flexion.  There is moderate tenderness to palpation in the neck    She has some muscle spasm over the rhomboids on the left which does reproduce some of her pain.  Neurological:      Mental Status: She is alert.      Comments: Motor was 5/5 in all major groups.           "

## 2024-04-02 ENCOUNTER — TELEPHONE (OUTPATIENT)
Dept: FAMILY MEDICINE CLINIC | Facility: CLINIC | Age: 79
End: 2024-04-02

## 2024-04-02 NOTE — TELEPHONE ENCOUNTER
----- Message from Steven Curiel MD sent at 4/1/2024  6:42 PM EDT -----  Please call the patient regarding lab results: X-ray of the neck shows some mild arthritis.

## 2024-05-08 DIAGNOSIS — S16.1XXA STRAIN OF NECK MUSCLE, INITIAL ENCOUNTER: ICD-10-CM

## 2024-05-08 RX ORDER — NAPROXEN 500 MG/1
500 TABLET ORAL 2 TIMES DAILY WITH MEALS
Qty: 60 TABLET | Refills: 1 | Status: SHIPPED | OUTPATIENT
Start: 2024-05-08

## 2024-06-01 DIAGNOSIS — I10 ESSENTIAL HYPERTENSION: ICD-10-CM

## 2024-06-01 RX ORDER — ATENOLOL 25 MG/1
25 TABLET ORAL DAILY
Qty: 90 TABLET | Refills: 3 | Status: SHIPPED | OUTPATIENT
Start: 2024-06-01

## 2024-08-05 DIAGNOSIS — R60.0 BILATERAL LOWER EXTREMITY EDEMA: ICD-10-CM

## 2024-08-06 RX ORDER — FUROSEMIDE 40 MG/1
40 TABLET ORAL DAILY
Qty: 90 TABLET | Refills: 1 | Status: SHIPPED | OUTPATIENT
Start: 2024-08-06

## 2024-09-01 DIAGNOSIS — I10 ESSENTIAL HYPERTENSION: ICD-10-CM

## 2024-09-01 DIAGNOSIS — R60.0 BILATERAL LOWER EXTREMITY EDEMA: ICD-10-CM

## 2024-09-01 RX ORDER — CHLORTHALIDONE 25 MG/1
25 TABLET ORAL DAILY
Qty: 90 TABLET | Refills: 1 | Status: SHIPPED | OUTPATIENT
Start: 2024-09-01

## 2024-09-01 RX ORDER — ENALAPRIL MALEATE 10 MG/1
20 TABLET ORAL 2 TIMES DAILY
Qty: 360 TABLET | Refills: 1 | Status: SHIPPED | OUTPATIENT
Start: 2024-09-01

## 2024-09-09 ENCOUNTER — OFFICE VISIT (OUTPATIENT)
Dept: FAMILY MEDICINE CLINIC | Facility: CLINIC | Age: 79
End: 2024-09-09
Payer: COMMERCIAL

## 2024-09-09 VITALS
WEIGHT: 116.6 LBS | DIASTOLIC BLOOD PRESSURE: 76 MMHG | HEIGHT: 61 IN | HEART RATE: 66 BPM | BODY MASS INDEX: 22.01 KG/M2 | SYSTOLIC BLOOD PRESSURE: 134 MMHG | OXYGEN SATURATION: 98 %

## 2024-09-09 DIAGNOSIS — R25.2 MUSCLE CRAMPS: ICD-10-CM

## 2024-09-09 DIAGNOSIS — E83.52 HYPERCALCEMIA: Primary | ICD-10-CM

## 2024-09-09 DIAGNOSIS — K21.9 GASTROESOPHAGEAL REFLUX DISEASE, UNSPECIFIED WHETHER ESOPHAGITIS PRESENT: ICD-10-CM

## 2024-09-09 DIAGNOSIS — Z12.31 SCREENING MAMMOGRAM, ENCOUNTER FOR: ICD-10-CM

## 2024-09-09 DIAGNOSIS — I10 ESSENTIAL HYPERTENSION: ICD-10-CM

## 2024-09-09 DIAGNOSIS — Z23 ENCOUNTER FOR IMMUNIZATION: ICD-10-CM

## 2024-09-09 PROCEDURE — 90662 IIV NO PRSV INCREASED AG IM: CPT | Performed by: INTERNAL MEDICINE

## 2024-09-09 PROCEDURE — G0008 ADMIN INFLUENZA VIRUS VAC: HCPCS | Performed by: INTERNAL MEDICINE

## 2024-09-09 PROCEDURE — 99214 OFFICE O/P EST MOD 30 MIN: CPT | Performed by: INTERNAL MEDICINE

## 2024-09-09 NOTE — PROGRESS NOTES
Ambulatory Visit  Name: Velma Weeks      : 1945      MRN: [id number]  Encounter Provider: Steven Curiel MD  Encounter Date: 2024   Encounter department: [facility]    Assessment & Plan  1. Muscle cramps.  The muscle cramps could be a side effect of her current medications, chlorthalidone and furosemide. A magnesium level test will be conducted to further investigate the cause of the cramps. Additionally, her electrolytes will be rechecked.    2.  Essential hypertension.  Her blood pressure is well-managed. However, chlorthalidone, one of her antihypertensive medications, can potentially elevate calcium levels. If her vitamin D levels are normal and calcium levels remain high, discontinuation of chlorthalidone may be considered.    3. Hypercalcemia.  Her calcium levels have been intermittently high over the past three years, with the most recent reading at 11.1. A basic metabolic panel, including calcium and magnesium levels, will be repeated. A vitamin D level will also be checked to rule out deficiency as a contributing factor.    4. Gastroesophageal Reflux Disease (GERD).  Her reflux symptoms are well-controlled with Prevacid 30 mg daily. She occasionally uses Tums or Pepcid at night if she consumes acidic foods.    5. Health Maintenance.  A mammogram will be ordered as she is due for one. She will receive her influenza vaccine today. The option for a COVID-19 booster was discussed, and she has the choice to receive it today or at a later date.       Assessment & Plan  Hypercalcemia    Screening mammogram, encounter for    Essential hypertension    Gastroesophageal reflux disease, unspecified whether esophagitis present    Encounter for immunization    Muscle cramps              History of Present Illness       History of Present Illness  The patient is a 79-year-old female who presents for evaluation of multiple medical concerns. She is accompanied by her  ".    She experiences occasional muscle cramps, which she manages with Tums twice daily. This treatment is generally effective, but there are instances when it does not alleviate the discomfort. The cramps can occur at any time, including during sleep or upon waking. She also takes magnesium glycinate to help manage these symptoms.    Her blood pressure has been well-controlled at home with most of her systolic readings in the 110s-120s.    She is due for her annual mammogram. She has declined further colonoscopies. Her neck issues from April 2024 have resolved without the need for physical therapy. She has not yet received her influenza vaccine.    For her reflux, she takes Prevacid 30 mg daily and occasionally uses Tums or Pepcid at night if she consumes more acidic foods than usual.        Objective     /76 (BP Location: Left arm, Patient Position: Sitting, Cuff Size: Standard)   Pulse 66   Ht 5' 1\" (1.549 m)   Wt 52.9 kg (116 lb 9.6 oz)   SpO2 98%   BMI 22.03 kg/m²       Physical Exam  Constitutional:       General: She is not in acute distress.     Appearance: Normal appearance. She is well-developed. She is not ill-appearing.   Neck:      Vascular: No JVD.   Cardiovascular:      Rate and Rhythm: Normal rate and regular rhythm.      Heart sounds: Normal heart sounds. No murmur heard.     No friction rub. No gallop.   Pulmonary:      Breath sounds: Normal breath sounds.   Abdominal:      General: Bowel sounds are normal. There is no distension.      Palpations: Abdomen is soft. There is no mass.   Musculoskeletal:         General: No swelling.   Neurological:      Mental Status: She is alert.       Administrative Statements       "

## 2024-10-04 ENCOUNTER — APPOINTMENT (OUTPATIENT)
Age: 79
End: 2024-10-04
Payer: COMMERCIAL

## 2024-10-04 DIAGNOSIS — E78.49 OTHER HYPERLIPIDEMIA: ICD-10-CM

## 2024-10-04 LAB
25(OH)D3 SERPL-MCNC: 54.2 NG/ML (ref 30–100)
ANION GAP SERPL CALCULATED.3IONS-SCNC: 8 MMOL/L (ref 4–13)
BUN SERPL-MCNC: 24 MG/DL (ref 5–25)
CALCIUM SERPL-MCNC: 10.2 MG/DL (ref 8.4–10.2)
CHLORIDE SERPL-SCNC: 99 MMOL/L (ref 96–108)
CHOLEST SERPL-MCNC: 179 MG/DL
CO2 SERPL-SCNC: 32 MMOL/L (ref 21–32)
CREAT SERPL-MCNC: 0.65 MG/DL (ref 0.6–1.3)
GFR SERPL CREATININE-BSD FRML MDRD: 85 ML/MIN/1.73SQ M
GLUCOSE P FAST SERPL-MCNC: 89 MG/DL (ref 65–99)
HDLC SERPL-MCNC: 55 MG/DL
LDLC SERPL CALC-MCNC: 106 MG/DL (ref 0–100)
MAGNESIUM SERPL-MCNC: 2.1 MG/DL (ref 1.9–2.7)
NONHDLC SERPL-MCNC: 124 MG/DL
POTASSIUM SERPL-SCNC: 4.1 MMOL/L (ref 3.5–5.3)
SODIUM SERPL-SCNC: 139 MMOL/L (ref 135–147)
TRIGL SERPL-MCNC: 90 MG/DL

## 2024-10-04 PROCEDURE — 80061 LIPID PANEL: CPT

## 2024-10-19 DIAGNOSIS — I10 ESSENTIAL HYPERTENSION: ICD-10-CM

## 2024-10-21 RX ORDER — FELODIPINE 10 MG/1
10 TABLET, EXTENDED RELEASE ORAL
Qty: 90 TABLET | Refills: 1 | Status: SHIPPED | OUTPATIENT
Start: 2024-10-21

## 2025-01-10 ENCOUNTER — TELEPHONE (OUTPATIENT)
Dept: FAMILY MEDICINE CLINIC | Facility: CLINIC | Age: 80
End: 2025-01-10

## 2025-01-10 NOTE — TELEPHONE ENCOUNTER
Called [facility] to discuss ongoing diarrhea. Symptoms started 6 days ago when she started having crampy abdominal pain and fatigue. Cramps resolved.  She developed diarrhea yesterday but today is only had one watery bowel movements.    If the diarrhea persists, I would recommend that she go on the BRAT diet and to use Imodium as needed.  If not, she can just advance her diet as tolerated.

## 2025-01-18 DIAGNOSIS — I10 ESSENTIAL HYPERTENSION: ICD-10-CM

## 2025-01-19 ENCOUNTER — VBI (OUTPATIENT)
Dept: ADMINISTRATIVE | Facility: OTHER | Age: 80
End: 2025-01-19

## 2025-01-19 NOTE — TELEPHONE ENCOUNTER
01/19/25 10:35 AM     Chart reviewed for Blood Pressure was/were submitted to the patient's insurance.     Tyler Rico MA   [facility]

## 2025-01-20 RX ORDER — FELODIPINE 10 MG/1
10 TABLET, EXTENDED RELEASE ORAL
Qty: 90 TABLET | Refills: 0 | Status: SHIPPED | OUTPATIENT
Start: 2025-01-20

## 2025-03-01 DIAGNOSIS — R60.0 BILATERAL LOWER EXTREMITY EDEMA: ICD-10-CM

## 2025-03-01 DIAGNOSIS — I10 ESSENTIAL HYPERTENSION: ICD-10-CM

## 2025-03-02 RX ORDER — ATENOLOL 25 MG/1
25 TABLET ORAL DAILY
Qty: 90 TABLET | Refills: 3 | Status: SHIPPED | OUTPATIENT
Start: 2025-03-02

## 2025-03-02 RX ORDER — CHLORTHALIDONE 25 MG/1
25 TABLET ORAL DAILY
Qty: 90 TABLET | Refills: 1 | Status: SHIPPED | OUTPATIENT
Start: 2025-03-02

## 2025-03-02 RX ORDER — ENALAPRIL MALEATE 10 MG/1
20 TABLET ORAL 2 TIMES DAILY
Qty: 360 TABLET | Refills: 1 | Status: SHIPPED | OUTPATIENT
Start: 2025-03-02

## 2025-03-05 ENCOUNTER — TELEPHONE (OUTPATIENT)
Dept: FAMILY MEDICINE CLINIC | Facility: CLINIC | Age: 80
End: 2025-03-05

## 2025-03-05 ENCOUNTER — TELEPHONE (OUTPATIENT)
Age: 80
End: 2025-03-05

## 2025-03-05 DIAGNOSIS — I10 ESSENTIAL HYPERTENSION: ICD-10-CM

## 2025-03-05 DIAGNOSIS — E78.49 OTHER HYPERLIPIDEMIA: ICD-10-CM

## 2025-03-05 DIAGNOSIS — G47.62 NOCTURNAL LEG CRAMPS: ICD-10-CM

## 2025-03-05 DIAGNOSIS — E83.52 HYPERCALCEMIA: Primary | ICD-10-CM

## 2025-03-05 NOTE — TELEPHONE ENCOUNTER
Yesica from  [facility] now called stating the pt showed up for her labs. No active labs in the chart. She was coming for routine labs to be drawn before her next AWV mon 3/10. Dr. Wyatt not in today. Please message him or see if another provider can order the labs for her if she is due.

## 2025-03-05 NOTE — TELEPHONE ENCOUNTER
Patient would like to have routine labs done prior to her appt with you on Monday.     Will you please place orders and I will let them know once they are in so they can have the labs done.     Thank you

## 2025-03-07 ENCOUNTER — APPOINTMENT (OUTPATIENT)
Age: 80
End: 2025-03-07
Payer: COMMERCIAL

## 2025-03-07 LAB
25(OH)D3 SERPL-MCNC: 48 NG/ML (ref 30–100)
ANION GAP SERPL CALCULATED.3IONS-SCNC: 6 MMOL/L (ref 4–13)
BUN SERPL-MCNC: 21 MG/DL (ref 5–25)
CALCIUM SERPL-MCNC: 10.4 MG/DL (ref 8.4–10.2)
CHLORIDE SERPL-SCNC: 103 MMOL/L (ref 96–108)
CHOLEST SERPL-MCNC: 172 MG/DL (ref ?–200)
CO2 SERPL-SCNC: 33 MMOL/L (ref 21–32)
CREAT SERPL-MCNC: 0.58 MG/DL (ref 0.6–1.3)
GFR SERPL CREATININE-BSD FRML MDRD: 88 ML/MIN/1.73SQ M
GLUCOSE P FAST SERPL-MCNC: 101 MG/DL (ref 65–99)
HDLC SERPL-MCNC: 56 MG/DL
LDLC SERPL CALC-MCNC: 98 MG/DL (ref 0–100)
MAGNESIUM SERPL-MCNC: 2.2 MG/DL (ref 1.9–2.7)
NONHDLC SERPL-MCNC: 116 MG/DL
POTASSIUM SERPL-SCNC: 4.1 MMOL/L (ref 3.5–5.3)
SODIUM SERPL-SCNC: 142 MMOL/L (ref 135–147)
TRIGL SERPL-MCNC: 92 MG/DL (ref ?–150)

## 2025-03-07 PROCEDURE — 83735 ASSAY OF MAGNESIUM: CPT

## 2025-03-07 PROCEDURE — 80061 LIPID PANEL: CPT

## 2025-03-07 PROCEDURE — 36415 COLL VENOUS BLD VENIPUNCTURE: CPT

## 2025-03-07 PROCEDURE — 80048 BASIC METABOLIC PNL TOTAL CA: CPT

## 2025-03-07 PROCEDURE — 82306 VITAMIN D 25 HYDROXY: CPT

## 2025-03-10 ENCOUNTER — OFFICE VISIT (OUTPATIENT)
Dept: FAMILY MEDICINE CLINIC | Facility: CLINIC | Age: 80
End: 2025-03-10
Payer: COMMERCIAL

## 2025-03-10 VITALS
HEIGHT: 61 IN | SYSTOLIC BLOOD PRESSURE: 130 MMHG | BODY MASS INDEX: 21.49 KG/M2 | HEART RATE: 60 BPM | DIASTOLIC BLOOD PRESSURE: 64 MMHG | OXYGEN SATURATION: 99 % | WEIGHT: 113.8 LBS

## 2025-03-10 DIAGNOSIS — Z12.31 ENCOUNTER FOR SCREENING MAMMOGRAM FOR BREAST CANCER: ICD-10-CM

## 2025-03-10 DIAGNOSIS — I10 ESSENTIAL HYPERTENSION: Primary | ICD-10-CM

## 2025-03-10 DIAGNOSIS — E78.49 OTHER HYPERLIPIDEMIA: ICD-10-CM

## 2025-03-10 DIAGNOSIS — E88.89 POOR DRUG METABOLIZER DUE TO CYTOCHROME P450 CYP2D6 VARIANT (HCC): ICD-10-CM

## 2025-03-10 DIAGNOSIS — E83.52 HYPERCALCEMIA: ICD-10-CM

## 2025-03-10 PROCEDURE — 99214 OFFICE O/P EST MOD 30 MIN: CPT | Performed by: INTERNAL MEDICINE

## 2025-03-10 PROCEDURE — G0439 PPPS, SUBSEQ VISIT: HCPCS | Performed by: INTERNAL MEDICINE

## 2025-03-10 PROCEDURE — G2211 COMPLEX E/M VISIT ADD ON: HCPCS | Performed by: INTERNAL MEDICINE

## 2025-03-10 NOTE — PROGRESS NOTES
Name: Velma Weeks      : 1945      MRN: [id number]  Encounter Provider: Steven Curiel MD  Encounter Date: 3/10/2025   Encounter department: [facility]  Assessment & Plan  Essential hypertension  Her blood pressure readings at home have been stable, with systolic values in the 130s or lower. She is currently on atenolol 25 mg daily, chlorthalidone 25 mg daily, enalapril 20 mg twice a day, and felodipine 10 mg once a day. She will continue her current medication regimen.       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       Other hyperlipidemia  Her lipid panel looks good since she has been on the red yeast rice.  Continue current regimen.  Orders:    Lipid panel; Future    Hypercalcemia  Her calcium levels remain elevated at 11.1, which could be a side effect of chlorthalidone. A parathyroid hormone level test will be added to her next round of blood tests to rule out hyperparathyroidism. If hyperparathyroidism is confirmed, potential treatment options will be discussed, considering her age and overall health status.  Orders:    Basic metabolic panel; Future    Vitamin D 25 hydroxy; Future    PTH, intact; Future    Encounter for screening mammogram for breast cancer            Preventive health issues were discussed with patient, and age appropriate screening tests were ordered as noted in patient's After Visit Summary. Personalized health advice and appropriate referrals for health education or preventive services given if needed, as noted in patient's After Visit Summary.    Health Maintenance.  She had a mammogram in December and is up to date with her screenings. She is not currently seeing a GI specialist as her previous provider retired. Given her age, further colorectal cancer screening is optional and can be discussed if she  wishes to continue.    History of Present Illness     History of Present Illness  The patient is a 79-year-old female who presents for a wellness visit. She is accompanied by her .    She reports satisfactory home blood pressure readings, with systolic values consistently below 130. She underwent routine blood work last Wednesday. Her current antihypertensive regimen includes atenolol 25 mg daily, chlorthalidone 25 mg daily, enalapril 20 mg twice daily, and felodipine 10 mg once daily.    She had been experiencing knee pain, which she attributes to weather changes. However, she reports no current discomfort.    She is not currently under the care of a gastroenterologist, as her previous provider has retired. She has not been referred to a new specialist. She has been informed that she does not require further colonoscopies due to her age. Her most recent mammogram was conducted in December.    She reports no issues with her memory.    MEDICATIONS  atenolol, chlorthalidone, enalapril, felodipine, red yeast rice         Medical History Reviewed by provider this encounter:       Annual Wellness Visit Questionnaire       Health Risk Assessment:   Patient rates overall health as good. Patient feels that their physical health rating is same. Patient is satisfied with their life. Eyesight was rated as same. Hearing was rated as same. Patient feels that their emotional and mental health rating is same. Patients states they are never, rarely angry. Patient states they are never, rarely unusually tired/fatigued. Pain experienced in the last 7 days has been none. Patient states that she has experienced no weight loss or gain in last 6 months.     Depression Screening:   PHQ-2 Score: 0      Fall Risk Screening:   In the past year, patient has experienced: no history of falling in past year      Urinary Incontinence Screening:   Patient has not leaked urine accidently in the last six months.     Home Safety:  Patient does  not have trouble with stairs inside or outside of their home. Patient has working smoke alarms and has working carbon monoxide detector. Home safety hazards include: none.     Nutrition:   Current diet is Regular.     Medications:   Patient is currently taking over-the-counter supplements. OTC medications include: see medication list. Patient is able to manage medications.     Activities of Daily Living (ADLs)/Instrumental Activities of Daily Living (IADLs):   Walk and transfer into and out of bed and chair?: Yes  Dress and groom yourself?: Yes    Bathe or shower yourself?: Yes    Feed yourself? Yes  Do your laundry/housekeeping?: Yes  Manage your money, pay your bills and track your expenses?: Yes  Make your own meals?: Yes    Do your own shopping?: Yes    Durable Medical Equipment Suppliers  None    Previous Hospitalizations:   Any hospitalizations or ED visits within the last 12 months?: No      Advance Care Planning:   Living will: Yes    Durable POA for healthcare: Yes    Advanced directive: Yes    Advanced directive counseling given: Yes    End of Life Decisions reviewed with patient: Yes    Provider agrees with end of life decisions: Yes      Comments: Velma chooses comfort care measures in the event of a terminal diagnosis.       Cognitive Screening:   Provider or family/friend/caregiver concerned regarding cognition?: No    PREVENTIVE SCREENINGS      Cardiovascular Screening:    General: Screening Not Indicated and History Lipid Disorder      Diabetes Screening:     General: Screening Current      Colorectal Cancer Screening:     General: Screening Not Indicated      Breast Cancer Screening:     General: Screening Current      Cervical Cancer Screening:    General: Screening Not Indicated      Osteoporosis Screening:    General: Screening Not Indicated and History Osteoporosis      Abdominal Aortic Aneurysm (AAA) Screening:        General: Screening Not Indicated      Lung Cancer Screening:     General:  "Screening Not Indicated      Hepatitis C Screening:    General: Screening Current    Screening, Brief Intervention, and Referral to Treatment (SBIRT)     Screening  Typical number of drinks in a day: 0  Typical number of drinks in a week: 0  Interpretation: Low risk drinking behavior.    AUDIT-C Screenin) How often did you have a drink containing alcohol in the past year? never  2) How many drinks did you have on a typical day when you were drinking in the past year? 0  3) How often did you have 6 or more drinks on one occasion in the past year? never    AUDIT-C Score: 0  Interpretation: Score 0-2 (female): Negative screen for alcohol misuse    Single Item Drug Screening:  How often have you used an illegal drug (including marijuana) or a prescription medication for non-medical reasons in the past year? never    Single Item Drug Screen Score: 0  Interpretation: Negative screen for possible drug use disorder    Social Drivers of Health     Financial Resource Strain: Low Risk  (3/4/2024)    Overall Financial Resource Strain (CARDIA)     Difficulty of Paying Living Expenses: Not hard at all   Food Insecurity: No Food Insecurity (3/5/2025)    Hunger Vital Sign     Worried About Running Out of Food in the Last Year: Never true     Ran Out of Food in the Last Year: Never true   Transportation Needs: No Transportation Needs (3/5/2025)    PRAPARE - Transportation     Lack of Transportation (Medical): No     Lack of Transportation (Non-Medical): No   Housing Stability: Unknown (3/5/2025)    Housing Stability Vital Sign     Unable to Pay for Housing in the Last Year: No     Homeless in the Last Year: No   Utilities: Not At Risk (3/5/2025)    [facility] Utilities     Threatened with loss of utilities: No     No results found.    Objective   /64 (BP Location: Left arm, Patient Position: Sitting, Cuff Size: Standard)   Pulse 60   Ht 5' 1\" (1.549 m)   Wt 51.6 kg (113 lb 12.8 oz)   SpO2 99%   BMI 21.50 kg/m²     Physical " Exam  Constitutional:       General: She is not in acute distress.     Appearance: Normal appearance. She is well-developed. She is not ill-appearing.   Neck:      Vascular: No JVD.   Cardiovascular:      Rate and Rhythm: Normal rate and regular rhythm.      Heart sounds: Normal heart sounds. No murmur heard.     No friction rub. No gallop.   Pulmonary:      Breath sounds: Normal breath sounds.   Abdominal:      General: Bowel sounds are normal. There is no distension.      Palpations: Abdomen is soft. There is no mass.   Musculoskeletal:         General: No swelling.   Neurological:      Mental Status: She is alert.

## 2025-03-10 NOTE — ASSESSMENT & PLAN NOTE
Her calcium levels remain elevated at 11.1, which could be a side effect of chlorthalidone. A parathyroid hormone level test will be added to her next round of blood tests to rule out hyperparathyroidism. If hyperparathyroidism is confirmed, potential treatment options will be discussed, considering her age and overall health status.  Orders:    Basic metabolic panel; Future    Vitamin D 25 hydroxy; Future    PTH, intact; Future

## 2025-03-10 NOTE — ASSESSMENT & PLAN NOTE
Her blood pressure readings at home have been stable, with systolic values in the 130s or lower. She is currently on atenolol 25 mg daily, chlorthalidone 25 mg daily, enalapril 20 mg twice a day, and felodipine 10 mg once a day. She will continue her current medication regimen.

## 2025-03-10 NOTE — ASSESSMENT & PLAN NOTE
Her lipid panel looks good since she has been on the red yeast rice.  Continue current regimen.  Orders:    Lipid panel; Future

## 2025-03-10 NOTE — ASSESSMENT & PLAN NOTE
We are trying to avoid starting new medications in particular if they are metabolized by the cytochrome P4 50 system.

## 2025-03-23 DIAGNOSIS — R60.0 BILATERAL LOWER EXTREMITY EDEMA: ICD-10-CM

## 2025-03-24 RX ORDER — FUROSEMIDE 40 MG/1
40 TABLET ORAL DAILY
Qty: 90 TABLET | Refills: 1 | Status: SHIPPED | OUTPATIENT
Start: 2025-03-24

## 2025-03-31 ENCOUNTER — TELEPHONE (OUTPATIENT)
Dept: FAMILY MEDICINE CLINIC | Facility: CLINIC | Age: 80
End: 2025-03-31

## 2025-03-31 NOTE — TELEPHONE ENCOUNTER
Spoke to Guera about her reflux symptoms.  She is already taking lansoprazole 30 mg every morning.  Recommend that she add Pepcid 20 mg at bedtime to see if this helps control her reflux symptoms.

## 2025-04-29 DIAGNOSIS — I10 ESSENTIAL HYPERTENSION: ICD-10-CM

## 2025-04-29 DIAGNOSIS — R60.0 BILATERAL LOWER EXTREMITY EDEMA: ICD-10-CM

## 2025-04-30 RX ORDER — FUROSEMIDE 40 MG/1
40 TABLET ORAL DAILY
Qty: 90 TABLET | Refills: 1 | Status: SHIPPED | OUTPATIENT
Start: 2025-04-30

## 2025-04-30 RX ORDER — CHLORTHALIDONE 25 MG/1
25 TABLET ORAL DAILY
Qty: 90 TABLET | Refills: 1 | Status: SHIPPED | OUTPATIENT
Start: 2025-04-30

## 2025-04-30 RX ORDER — ENALAPRIL MALEATE 10 MG/1
20 TABLET ORAL 2 TIMES DAILY
Qty: 360 TABLET | Refills: 1 | Status: SHIPPED | OUTPATIENT
Start: 2025-04-30

## 2025-04-30 RX ORDER — ATENOLOL 25 MG/1
25 TABLET ORAL DAILY
Qty: 90 TABLET | Refills: 3 | Status: SHIPPED | OUTPATIENT
Start: 2025-04-30

## 2025-07-29 ENCOUNTER — OFFICE VISIT (OUTPATIENT)
Age: 80
End: 2025-07-29
Payer: COMMERCIAL

## 2025-07-29 VITALS
BODY MASS INDEX: 21.52 KG/M2 | OXYGEN SATURATION: 96 % | WEIGHT: 114 LBS | SYSTOLIC BLOOD PRESSURE: 138 MMHG | HEART RATE: 64 BPM | HEIGHT: 61 IN | DIASTOLIC BLOOD PRESSURE: 78 MMHG

## 2025-07-29 DIAGNOSIS — M81.8 OTHER OSTEOPOROSIS WITHOUT CURRENT PATHOLOGICAL FRACTURE: ICD-10-CM

## 2025-07-29 DIAGNOSIS — I10 ESSENTIAL HYPERTENSION: Primary | ICD-10-CM

## 2025-07-29 DIAGNOSIS — K21.9 GASTROESOPHAGEAL REFLUX DISEASE WITHOUT ESOPHAGITIS: ICD-10-CM

## 2025-07-29 DIAGNOSIS — E78.49 OTHER HYPERLIPIDEMIA: ICD-10-CM

## 2025-07-29 DIAGNOSIS — R60.0 BILATERAL LOWER EXTREMITY EDEMA: ICD-10-CM

## 2025-07-29 PROCEDURE — 99204 OFFICE O/P NEW MOD 45 MIN: CPT | Performed by: FAMILY MEDICINE

## 2025-07-29 RX ORDER — FELODIPINE 10 MG/1
10 TABLET, EXTENDED RELEASE ORAL
Qty: 90 TABLET | Refills: 1 | Status: SHIPPED | OUTPATIENT
Start: 2025-07-29

## 2025-07-29 RX ORDER — CEPHALEXIN 500 MG/1
CAPSULE ORAL
COMMUNITY
Start: 2025-05-29 | End: 2025-07-29 | Stop reason: ALTCHOICE

## 2025-07-29 RX ORDER — FUROSEMIDE 40 MG/1
40 TABLET ORAL DAILY
Qty: 90 TABLET | Refills: 1 | Status: SHIPPED | OUTPATIENT
Start: 2025-07-29